# Patient Record
Sex: FEMALE | Race: WHITE | NOT HISPANIC OR LATINO | Employment: OTHER | URBAN - METROPOLITAN AREA
[De-identification: names, ages, dates, MRNs, and addresses within clinical notes are randomized per-mention and may not be internally consistent; named-entity substitution may affect disease eponyms.]

---

## 2017-01-05 ENCOUNTER — CLINICAL SUPPORT (OUTPATIENT)
Dept: SPEECH THERAPY | Facility: HOSPITAL | Age: 42
End: 2017-01-05
Attending: OTOLARYNGOLOGY
Payer: COMMERCIAL

## 2017-01-05 ENCOUNTER — OFFICE VISIT (OUTPATIENT)
Dept: OTOLARYNGOLOGY | Facility: CLINIC | Age: 42
End: 2017-01-05
Payer: COMMERCIAL

## 2017-01-05 VITALS
WEIGHT: 105 LBS | TEMPERATURE: 99 F | HEART RATE: 61 BPM | SYSTOLIC BLOOD PRESSURE: 105 MMHG | DIASTOLIC BLOOD PRESSURE: 60 MMHG | BODY MASS INDEX: 17.47 KG/M2

## 2017-01-05 DIAGNOSIS — C02.9 SQUAMOUS CELL CARCINOMA OF TONGUE: Primary | ICD-10-CM

## 2017-01-05 DIAGNOSIS — R47.1 LINGUAL DYSARTHRIA: ICD-10-CM

## 2017-01-05 DIAGNOSIS — R47.1 DYSARTHRIA: ICD-10-CM

## 2017-01-05 DIAGNOSIS — Z98.890 S/P PARTIAL GLOSSECTOMY: ICD-10-CM

## 2017-01-05 DIAGNOSIS — C02.9 TONGUE CANCER: ICD-10-CM

## 2017-01-05 PROCEDURE — 99213 OFFICE O/P EST LOW 20 MIN: CPT | Mod: S$GLB,,, | Performed by: OTOLARYNGOLOGY

## 2017-01-05 PROCEDURE — 1159F MED LIST DOCD IN RCRD: CPT | Mod: S$GLB,,, | Performed by: OTOLARYNGOLOGY

## 2017-01-05 PROCEDURE — 99999 PR PBB SHADOW E&M-EST. PATIENT-LVL III: CPT | Mod: PBBFAC,,, | Performed by: OTOLARYNGOLOGY

## 2017-01-05 PROCEDURE — G8999 MOTOR SPEECH CURRENT STATUS: HCPCS | Mod: GN,CI | Performed by: SPEECH-LANGUAGE PATHOLOGIST

## 2017-01-05 PROCEDURE — 92507 TX SP LANG VOICE COMM INDIV: CPT | Mod: GN | Performed by: SPEECH-LANGUAGE PATHOLOGIST

## 2017-01-05 PROCEDURE — G9186 MOTOR SPEECH GOAL STATUS: HCPCS | Mod: GN,CH | Performed by: SPEECH-LANGUAGE PATHOLOGIST

## 2017-01-05 RX ORDER — ZOLPIDEM TARTRATE 5 MG/1
5 TABLET ORAL
COMMUNITY
Start: 2016-12-23 | End: 2017-12-23

## 2017-01-05 NOTE — PROGRESS NOTES
DIAGNOSIS:  Dysarthria (R47.1), s/p partial glossectomy (Z98.890), History tongue cancer (Z85.810)  REFERRING PHYSICIAN:  Maximus Morrow M.D., Head and Neck Surgical Oncologist  LENGTH OF VISIT:  1 hour    REASON FOR VISIT:  sixth therapy visit.     INTERVAL HISTORY:  Currently with sore throat, especially at night.  Feels mucous in chest.  Taking Zycam.    INTERVENTION:  Short-term objectives:  Ms. Mueller will  1. Present for therapy twice weekly for 4-6 weeks.   Being met.    2. Perform a home program 2-3x/day by report.   Progressing.    3. Demonstrate appropriate execution of oral stretches and motor exercises with clinician with % consistency.   Able to execute accurately (100%).  Goal met 12/8/16.    4. Be able to place and/or protrude her tongue tip at midline on 10 of 10 trials.   100%.  Goal met 12/8/16.   Improved control of R lateral border to inhibit it from overpowering and actually rising superiorly as she attempts to protrude tongue.      5. Be able to place her tongue tip in her L oral commissure.   Met 1/5/17 with effort.  (Readily placing in R oral commissure.)    6. Be able to place her tongue tip on her alveolar ridge with upper-lower arch separation of 5-10 mm.   Able to separate teeth 10 mm while holding tongue contact to alveolus.  Getting nice retroflexion to stretch.  Goal met 12/16, but continuing for maintenance.   Additionally, able to separate teeth slightly during productions of phonemes or phoneme groupings that require lingual approximation/contact of alveolar ridge and had better release of sound from oral cavity (vs damping down of signal through more approximated arches).    7. Be able to clear a sticky food from her hard palate with her tongue during swallowing with 1-3 swallows.   Deferred.     8. Be able to clear food residue from at least one area of her labial sulcus.   Improving.  Can, with typical assistance from lips, place tongue tip down into the anterior labial  "sulcus and feel labial frenulum.  Performing tongue sweep well.    9. Achieve at least partial lateral border elevation on the R.   Deferred.    Speech targets:  90% with sentences 1x/each, all contexts.  Transitioning to conversation, particularly relaxing musculature around lips which tends to be activated in a compensatory manner to control oral air flow, but which now contributes to distorted acoustic quality.  She is able to relax this when focused and can appreciate the acoustic improvement.  Use of mirror for monitoring in office was distracting to her as she was aware of not looking at her listener, but she can use this at home when alone.   Added some targets to address "sh" and its cognate which sometimes need increased lip rounding to help channel air centrally.      PROGRESS:  Good.    IMPRESSIONS:   This 40 yo woman appears to present with   1.  Mild dysarthria; improving.  2.  Mild oral phase dysphagia secondary to lingual deficits; improving.  3.  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  The only evidence of it was inability to impound air in the oral cavity on puffing cheeks and reported challenges when swishing liquids.  4.  S/p R lateral partial glossectomy with closure by secondary intention and Right radical tonsillectomy with resection of the anterior tonsil pillar and retromolar trigone on 9/28/16.  5.  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 Morrow.    G codes:  Motor Speech  Current status: FCM:  LEVEL 6: The individual is successfully able to communicate intelligibly in most activities, but some limitations in intelligibility are still apparent in vocational, avocational, and social activities. The individual rarely requires minimal cueing to produce complex " sentences/messages intelligibly. The individual usually uses compensatory strategies when encountering difficulty.   - CI   Projected status:  FCM:   LEVEL 7: The individual¢s ability to successfully and independently participate in vocational, avocational, or social activities is not limited by speech production.  Independent functioning may occasionally include the use of compensatory techniques.   - CH     RECOMMENDATIONS/PLAN OF CARE:  It is felt that Ms. Mueller would benefit from  1.  Continued speech therapy in two weeks for carry-over into spontaneous conversation.  Anticipate that as final session.  2.  Continued f/u with Dr. Morrow as directed.    Long-term goals:  Ms. Mueller will be able to   1.  Have improved clarity of speech sounds in conversational speech.  2.  Consume a regular diet with an oral preparatory phase within functional limits with or without appropriate compensatory strategies.

## 2017-01-05 NOTE — MR AVS SNAPSHOT
[facility]  [address]  [facility] [address]  Phone: [phone]  Fax: [phone]                  Rose Mueller   2017 9:15 AM   Office Visit    Description:  Female : 1975   Provider:  Maximus Morrow MD   Department:  [facility]           Reason for Visit     Follow-up           Diagnoses this Visit        Comments    Squamous cell carcinoma of tongue    -  Primary            To Do List           Future Appointments        Provider Department Dept Phone    2017 9:00 AM Antonia Madera MA, CCC-SLP [facility] [phone]    2017 10:00 AM Maximus Morrow MD [facility] [phone]      Goals (5 Years of Data)     None      Follow-Up and Disposition     Return in about 6 weeks (around 2017).      [facility]     [facility] -  Assistance  Registered nurses in the [facility] provide clinical advisement, health education, appointment booking, and other advisory services.  Call for this free service at [phone].             Medications           Message regarding Medications     Verify the changes and/or additions to your medication regime listed below are the same as discussed with your clinician today.  If any of these changes or additions are incorrect, please notify your healthcare provider.             Verify that the below list of medications is an accurate representation of the medications you are currently taking.  If none reported, the list may be blank. If incorrect, please contact your healthcare provider. Carry this list with you in case of emergency.           Current Medications     alprazolam (XANAX XR) 0.5 MG Tb24     ascorbic acid, vitamin C, (VITAMIN C) 500 MG tablet Take 500 mg by mouth once daily.    CYANOCOBALAMIN, VITAMIN B-12, (VITAMIN B-12 ORAL) Take by mouth.    hydrocodone-acetaminophen (HYCET) solution 7.5-325 mg/15mL Take 15  mLs by mouth every 4 (four) hours as needed.    lansoprazole (PREVACID) 30 MG capsule Take 30 mg by mouth once daily.    minocycline (MINOCIN,DYNACIN) 100 MG capsule TK ONE C PO  QD    multivitamin (ONE DAILY MULTIVITAMIN) per tablet Take 1 tablet by mouth once daily.    ondansetron (ZOFRAN-ODT) 4 MG TbDL Take 1 tablet (4 mg total) by mouth every 8 (eight) hours as needed.    sumatriptan (IMITREX) 50 MG tablet TK ONE T PO FOR MIGRAINE HEADACHE. MAY REPEAT I 2 HOURS IF NOT RESOLVED. DO NOT TAKE MORE THAN 2 IN 1 DAY OR 3 IN 1 WEEK    TRINESSA LO 0.18/0.215/0.25 mg-25 mcg tablet TK 1 T PO QD    zolpidem (AMBIEN) 5 MG Tab Take 5 mg by mouth.           Clinical Reference Information           Vital Signs - Last Recorded  Most recent update: 1/5/2017 11:36 AM by Franchesca Kitchen MA    BP Pulse Temp Wt BMI    105/60 (BP Location: Left arm, Patient Position: Sitting) 61 98.6 °F (37 °C) (Tympanic) 47.6 kg (105 lb) 17.47 kg/m2      Blood Pressure          Most Recent Value    BP  105/60      Allergies as of 1/5/2017     No Known Allergies      Immunizations Administered on Date of Encounter - 1/5/2017     None      Instructions    Please be aware of the symptoms of head and neck cancer -  including, but not limited to, swelling in the neck, changes in voice and swallowing, and pain -- contact me if any of these symptoms appear and persist for more than 3-4 weeks.

## 2017-01-05 NOTE — PATIENT INSTRUCTIONS
Please be aware of the symptoms of head and neck cancer -  including, but not limited to, swelling in the neck, changes in voice and swallowing, and pain -- contact me if any of these symptoms appear and persist for more than 3-4 weeks.

## 2017-01-05 NOTE — PROGRESS NOTES
Subjective:     [facility]     Patient ID: Rose Mueller is a 41 y.o. female.    Chief Complaint:  Chief Complaint   Patient presents with    Follow-up      HPI     TREATMENT HISTORY:  1. Right partial glossectomy, 9/28/2016    Rose Mueller returns to the [facility] for follow-up after her right partial glossectomy. She has some variable sensations on the right side of the tongue, and the adjacent gums are healing as well - there is less tenderness. She has been working with [facility], and she feels that her speech is continuing to improve. She saw her dentist 6 weeks ago and had a good exam and good films. She gets some funny sensations on the gums adjacent to the surgery site, but this is stable. There remains some sensitivity in the back of her tongue when she actively tries to stretch it.    Her history is as follows: she presented with a 1 year history of a right sided tongue lesion.  It was first seen by her dentist during a routine dental exam.  She then saw an oral surgeon, who thought it was related to irritation or trauma.  She was treated with Magic Mouthwash with no relief of symptoms.  She then saw an ENT in [place] who biopsied it last year, with benign results.  It never healed, and we discussed a wide excisional biopsy that she deferred.     There is no dysphagia, odynophagia, sore throat, or otalgia. She has no adenopathy, fever, chills, or weight loss.  She is a non smoker.     She does not use tobacco products. There have been no interval changes in her health.    Past Medical History   Diagnosis Date    Anxiety about health 9/6/2016     previously treated for CIRILO    Headache     Hx of psychiatric care     Lesion of tongue     Migraine headache     Psychiatric problem     Sciatica     Squamous cell carcinoma in situ of tongue 9/6/2016    Therapy        Past Surgical History   Procedure Laterality Date    Partial glossectomy Right 09/28/2016          Current Outpatient Prescriptions:     TRINESSA LO 0.18/0.215/0.25 mg-25 mcg tablet, TK 1 T PO QD, Disp: , Rfl: 9    alprazolam (XANAX XR) 0.5 MG Tb24, , Disp: , Rfl:     ascorbic acid, vitamin C, (VITAMIN C) 500 MG tablet, Take 500 mg by mouth once daily., Disp: , Rfl:     CYANOCOBALAMIN, VITAMIN B-12, (VITAMIN B-12 ORAL), Take by mouth., Disp: , Rfl:     hydrocodone-acetaminophen (HYCET) solution 7.5-325 mg/15mL, Take 15 mLs by mouth every 4 (four) hours as needed., Disp: 473 mL, Rfl: 0    lansoprazole (PREVACID) 30 MG capsule, Take 30 mg by mouth once daily., Disp: , Rfl:     minocycline (MINOCIN,DYNACIN) 100 MG capsule, TK ONE C PO  QD, Disp: , Rfl: 1    multivitamin (ONE DAILY MULTIVITAMIN) per tablet, Take 1 tablet by mouth once daily., Disp: , Rfl:     ondansetron (ZOFRAN-ODT) 4 MG TbDL, Take 1 tablet (4 mg total) by mouth every 8 (eight) hours as needed., Disp: 12 tablet, Rfl: 1    sumatriptan (IMITREX) 50 MG tablet, TK ONE T PO FOR MIGRAINE HEADACHE. MAY REPEAT I 2 HOURS IF NOT RESOLVED. DO NOT TAKE MORE THAN 2 IN 1 DAY OR 3 IN 1 WEEK, Disp: , Rfl: 0    zolpidem (AMBIEN) 5 MG Tab, Take 5 mg by mouth., Disp: , Rfl:     Review of patient's allergies indicates:  No Known Allergies    Social History     Social History    Marital status: Single     Spouse name: N/A    Number of children: 0    Years of education: N/A     Occupational History          self employed     Social History Main Topics    Smoking status: Never Smoker    Smokeless tobacco: Not on file    Alcohol use Not on file    Drug use: Not on file    Sexual activity: Not on file     Other Topics Concern    Patient Feels They Ought To Cut Down On Drinking/Drug Use No    Patient Annoyed By Others Criticizing Their Drinking/Drug Use No    Patient Has Felt Bad Or Guilty About Drinking/Drug Use No    Patient Has Had A Drink/Used Drugs As An Eye Opener In The [place] No     Social History Narrative    She is an ,  single, lives alone, no children       Family History   Problem Relation Age of Onset    Cancer Maternal Grandfather     Celiac disease Sister     Crohn's disease Sister     Autoimmune disease Sister      Review of Systems   Constitutional: Negative for activity change, appetite change, chills, diaphoresis, fatigue, fever and unexpected weight change.   HENT: Negative for congestion, dental problem, drooling, ear discharge, ear pain, facial swelling, hearing loss, mouth sores, nosebleeds, postnasal drip, rhinorrhea, sinus pressure, sneezing, sore throat, tinnitus, trouble swallowing and voice change.    Eyes: Negative for pain, discharge, redness, itching and visual disturbance.   Respiratory: Negative for cough, choking and shortness of breath.    Cardiovascular: Negative for chest pain, palpitations and leg swelling.   Gastrointestinal: Negative for abdominal distention, abdominal pain, constipation, diarrhea, nausea and vomiting.   Endocrine: Negative for cold intolerance and heat intolerance.   Genitourinary: Negative for difficulty urinating, dysuria and hematuria.   Musculoskeletal: Negative for arthralgias, back pain, gait problem, myalgias, neck pain and neck stiffness.   Skin: Negative for rash and wound.   Allergic/Immunologic: Negative for environmental allergies and immunocompromised state.   Neurological: Positive for numbness and headaches. Negative for dizziness, facial asymmetry, speech difficulty, weakness and light-headedness.   Hematological: Negative for adenopathy. Bruises/bleeds easily.   Psychiatric/Behavioral: Negative for dysphoric mood. The patient is not nervous/anxious.        Objective:      Physical Exam   Constitutional: She is oriented to person, place, and time. She appears well-developed and well-nourished. She is cooperative. She does not have a sickly appearance. She does not appear ill. No distress.   HENT:   Head: Normocephalic and atraumatic.   Right Ear: Hearing and  external ear normal.   Left Ear: Hearing and external ear normal.   Nose: Nose normal. No mucosal edema, rhinorrhea or nasal deformity.   Mouth/Throat: Uvula is midline, oropharynx is clear and moist and mucous membranes are normal. Mucous membranes are not pale, not dry and not cyanotic. She does not have dentures. No oral lesions. No trismus in the jaw. Normal dentition. No uvula swelling or lacerations. No oropharyngeal exudate, posterior oropharyngeal edema, posterior oropharyngeal erythema or tonsillar abscesses.            Eyes: Conjunctivae, EOM and lids are normal. Pupils are equal, round, and reactive to light. Right conjunctiva is not injected. Left conjunctiva is not injected.   Neck: Normal range of motion, full passive range of motion without pain and phonation normal. Neck supple. No JVD present. No tracheal deviation present. No thyroid mass and no thyromegaly present.   Salivary glands - there are no lesions or asymmetric findings in the submandibular or parotid glands     Cardiovascular: Normal rate, regular rhythm and normal pulses.    Pulmonary/Chest: Effort normal. No stridor. No respiratory distress.   Abdominal: She exhibits no distension. There is no guarding.   Musculoskeletal: She exhibits no edema or tenderness.   Lymphadenopathy:        Head (right side): No submental, no submandibular, no tonsillar, no preauricular and no posterior auricular adenopathy present.        Head (left side): No submental, no submandibular, no tonsillar, no preauricular and no posterior auricular adenopathy present.     She has no cervical adenopathy.        Right cervical: No deep cervical and no posterior cervical adenopathy present.       Left cervical: No deep cervical and no posterior cervical adenopathy present.        Right: No supraclavicular adenopathy present.        Left: No supraclavicular adenopathy present.   Neurological: She is alert and oriented to person, place, and time. No cranial nerve  deficit. Gait normal.   Skin: No lesion and no rash noted. No cyanosis or erythema. Nails show no clubbing.   Psychiatric: She has a normal mood and affect. Her speech is normal.   Vitals reviewed.          Assessment:       1. Squamous cell carcinoma of tongue       Plan:       Ms. 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 There is no indication for neck dissection. She has made great strides with speech therapy. I would like to see her back in 6 weeks for routine surveillance.

## 2017-01-05 NOTE — MR AVS SNAPSHOT
[facility]  [address]  [place] [address]  Phone: [phone]                  Rose Mueller   2017 10:00 AM   Clinical Support    Description:  Female : 1975   Provider:  Antonia Madera MA, CCC-SLP   Department:  [facility]           Diagnoses this Visit        Comments    Dysarthria         S/P partial glossectomy         Tongue cancer                To Do List           Future Appointments        Provider Department Dept Phone    2017 9:00 AM Antonia Madera MA, CCC-SLP [facility] [phone]      Goals (5 Years of Data)     None      [facility]     [facility] Care [facility] -  Assistance  Registered nurses in the [facility] provide clinical advisement, health education, appointment booking, and other advisory services.  Call for this free service at [phone].             Medications           Message regarding Medications     Verify the changes and/or additions to your medication regime listed below are the same as discussed with your clinician today.  If any of these changes or additions are incorrect, please notify your healthcare provider.             Verify that the below list of medications is an accurate representation of the medications you are currently taking.  If none reported, the list may be blank. If incorrect, please contact your healthcare provider. Carry this list with you in case of emergency.           Current Medications     alprazolam (XANAX XR) 0.5 MG Tb24     ascorbic acid, vitamin C, (VITAMIN C) 500 MG tablet Take 500 mg by mouth once daily.    CYANOCOBALAMIN, VITAMIN B-12, (VITAMIN B-12 ORAL) Take by mouth.    hydrocodone-acetaminophen (HYCET) solution 7.5-325 mg/15mL Take 15 mLs by mouth every 4 (four) hours as needed.    lansoprazole (PREVACID) 30 MG capsule Take 30 mg by mouth once daily.    minocycline (MINOCIN,DYNACIN) 100 MG capsule TK ONE C PO  QD     "multivitamin (ONE DAILY MULTIVITAMIN) per tablet Take 1 tablet by mouth once daily.    ondansetron (ZOFRAN-ODT) 4 MG TbDL Take 1 tablet (4 mg total) by mouth every 8 (eight) hours as needed.    sumatriptan (IMITREX) 50 MG tablet TK ONE T PO FOR MIGRAINE HEADACHE. MAY REPEAT I 2 HOURS IF NOT RESOLVED. DO NOT TAKE MORE THAN 2 IN 1 DAY OR 3 IN 1 WEEK    TRINESSA LO 0.18/0.215/0.25 mg-25 mcg tablet TK 1 T PO QD           Clinical Reference Information           Allergies as of 1/5/2017     No Known Allergies      Immunizations Administered on Date of Encounter - 1/5/2017     None      Orders Placed During Today's Visit      Normal Orders This Visit    SLP treatment for speech/language       Instructions    Oral exercises and stretches:  Do 2-3x per day, 10 reps each:  *  Yawning - to stretch the back of the throat and help the soft palate's range of motion  *  Scar stretching:  Use a clean or gloved finger and stretch the scar beneath your tongue and the R side of your your tongue.  Try to  what is under the pad of your fingertip and stretch toward your tongue tip.    *  Try to align your tongue tip with the center of your upper or lower teeth and hold 5 seconds.  *  Find the center spot and stick your tongue out as far as possible; hold for 5-6 seconds.  Use the stack of tongue depressors to keep your jaw out of play.  *  Move your tongue tip along the inside of your lower teeth as far back as you can hold and hold for 5 seconds. Do in each direction.  Repeat inside top arch and outside top and bottom arches.  *  Move your tongue toward the Left corner of your mouth and hold (at farthest point) for 5 seconds.  *  Curl your back as far as possible and hold 5 seconds.  *  Raise your tongue tip to the spot right behind your top teeth (help with your jaw as needed right now) and hold the spot where you feel a stretch (the farthest away you can hold your jaw and still touch the tongue tip).  * Do an "effortful swallow" " "-  Like you are trying to get a glob of peanut butter or big pill down.  This asks all the parts involved in swallowing to move as far as possible, and will include your tongue.  Do 10.     Speech:  Manage saliva as soon as you notice it to help eliminate "lip control."    Try some tongue twisters with /s/ and "sh" in them (Yvonne sells seashells...).  Use round lips to help channel air centrally for "sh" and "zh."    I saw Sarah Beth sitting in a [employer] shop.  So, this is the .      Watch -- as you can tolerate -- in the mirror to monitor for excess tension around the lips and relax out of it.    Wednesday the fourth or Thursday the fifth.         "

## 2017-02-16 ENCOUNTER — OFFICE VISIT (OUTPATIENT)
Dept: OTOLARYNGOLOGY | Facility: CLINIC | Age: 42
End: 2017-02-16
Payer: COMMERCIAL

## 2017-02-16 VITALS
BODY MASS INDEX: 17.94 KG/M2 | SYSTOLIC BLOOD PRESSURE: 129 MMHG | DIASTOLIC BLOOD PRESSURE: 58 MMHG | TEMPERATURE: 98 F | HEART RATE: 57 BPM | WEIGHT: 107.81 LBS

## 2017-02-16 DIAGNOSIS — C02.9 SQUAMOUS CELL CARCINOMA OF TONGUE: Primary | ICD-10-CM

## 2017-02-16 DIAGNOSIS — R47.1 DYSARTHRIA: ICD-10-CM

## 2017-02-16 PROCEDURE — 99999 PR PBB SHADOW E&M-EST. PATIENT-LVL III: CPT | Mod: PBBFAC,,, | Performed by: OTOLARYNGOLOGY

## 2017-02-16 PROCEDURE — 99213 OFFICE O/P EST LOW 20 MIN: CPT | Mod: S$GLB,,, | Performed by: OTOLARYNGOLOGY

## 2017-02-16 NOTE — PROGRESS NOTES
Subjective:     [facility]     Patient ID: Rose Mueller is a 41 y.o. female.    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 Right partial glossectomy, 9/28/2016    Rose Mueller returns to [facility] for follow-up after her right partial glossectomy. She has some tingling still on the side of the tongue, and she can feel it when she stretches it. It is not painful, though. She has one more visit with Antonia, and she feels that her speech is continuing to improve. She saw her dentist 6 weeks ago and had a good exam and good films. The sensations on the gums are improved as well.     Her history is as follows: she presented with a 1 year history of a right sided tongue lesion.  It was first seen by her dentist during a routine dental exam.  She then saw an oral surgeon, who thought it was related to irritation or trauma.  She was treated with Magic Mouthwash with no relief of symptoms.  She then saw an ENT in [place] who biopsied it last year, with benign results.  It never healed, and we discussed a wide excisional biopsy that she deferred.     There is no dysphagia, odynophagia, sore throat, or otalgia. She has no adenopathy, fever, chills, or weight loss.  She is a non smoker.     She does not use tobacco products. There have been no interval changes in her health.    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 9/6/2016    Therapy        Past Surgical History   Procedure Laterality Date    Partial glossectomy Right 09/28/2016         Current Outpatient Prescriptions:     ascorbic acid, vitamin C, (VITAMIN C) 500 MG tablet, Take 500 mg by  mouth once daily., Disp: , Rfl:     CYANOCOBALAMIN, VITAMIN B-12, (VITAMIN B-12 ORAL), Take by mouth., Disp: , Rfl:     multivitamin (ONE DAILY MULTIVITAMIN) per tablet, Take 1 tablet by mouth once daily., Disp: , Rfl:     TRINESSA LO 0.18/0.215/0.25 mg-25 mcg tablet, TK 1 T PO QD, Disp: , Rfl: 9    alprazolam (XANAX XR) 0.5 MG Tb24, , Disp: , Rfl:     hydrocodone-acetaminophen (HYCET) solution 7.5-325 mg/15mL, Take 15 mLs by mouth every 4 (four) hours as needed., Disp: 473 mL, Rfl: 0    lansoprazole (PREVACID) 30 MG capsule, Take 30 mg by mouth once daily., Disp: , Rfl:     minocycline (MINOCIN,DYNACIN) 100 MG capsule, TK ONE C PO  QD, Disp: , Rfl: 1    ondansetron (ZOFRAN-ODT) 4 MG TbDL, Take 1 tablet (4 mg total) by mouth every 8 (eight) hours as needed., Disp: 12 tablet, Rfl: 1    sumatriptan (IMITREX) 50 MG tablet, TK ONE T PO FOR MIGRAINE HEADACHE. MAY REPEAT I 2 HOURS IF NOT RESOLVED. DO NOT TAKE MORE THAN 2 IN 1 DAY OR 3 IN 1 WEEK, Disp: , Rfl: 0    zolpidem (AMBIEN) 5 MG Tab, Take 5 mg by mouth., Disp: , Rfl:     Review of patient's allergies indicates:  No Known Allergies    Social History     Social History    Marital status: Single     Spouse name: N/A    Number of children: 0    Years of education: N/A     Occupational History          self employed     Social History Main Topics    Smoking status: Never Smoker    Smokeless tobacco: Not on file    Alcohol use Not on file    Drug use: Not on file    Sexual activity: Not on file     Other Topics Concern    Patient Feels They Ought To Cut Down On Drinking/Drug Use No    Patient Annoyed By Others Criticizing Their Drinking/Drug Use No    Patient Has Felt Bad Or Guilty About Drinking/Drug Use No    Patient Has Had A Drink/Used Drugs As An Eye Opener In The [place] No     Social History Narrative    She is an , single, lives alone, no children       Family History   Problem Relation Age of Onset    Cancer Maternal  Grandfather     Celiac disease Sister     Crohn's disease Sister     Autoimmune disease Sister      Review of Systems   Constitutional: Negative for activity change, appetite change, chills, diaphoresis, fatigue, fever and unexpected weight change.   HENT: Negative for congestion, dental problem, drooling, ear discharge, ear pain, facial swelling, hearing loss, mouth sores, nosebleeds, postnasal drip, rhinorrhea, sinus pressure, sneezing, sore throat, tinnitus, trouble swallowing and voice change.    Eyes: Negative for pain, discharge, redness, itching and visual disturbance.   Respiratory: Negative for cough, choking and shortness of breath.    Cardiovascular: Negative for chest pain, palpitations and leg swelling.   Gastrointestinal: Negative for abdominal distention, abdominal pain, constipation, diarrhea, nausea and vomiting.   Endocrine: Negative for cold intolerance and heat intolerance.   Genitourinary: Negative for difficulty urinating, dysuria and hematuria.   Musculoskeletal: Negative for arthralgias, back pain, gait problem, myalgias, neck pain and neck stiffness.   Skin: Negative for rash and wound.   Allergic/Immunologic: Negative for environmental allergies and immunocompromised state.   Neurological: Positive for numbness and headaches. Negative for dizziness, facial asymmetry, speech difficulty, weakness and light-headedness.   Hematological: Negative for adenopathy. Bruises/bleeds easily.   Psychiatric/Behavioral: Negative for dysphoric mood. The patient is not nervous/anxious.        Objective:      Physical Exam   Constitutional: She is oriented to person, place, and time. She appears well-developed and well-nourished. She is cooperative. She does not have a sickly appearance. She does not appear ill. No distress.   HENT:   Head: Normocephalic and atraumatic.   Right Ear: Hearing and external ear normal.   Left Ear: Hearing and external ear normal.   Nose: Nose normal. No mucosal edema,  rhinorrhea or nasal deformity.   Mouth/Throat: Uvula is midline, oropharynx is clear and moist and mucous membranes are normal. Mucous membranes are not pale, not dry and not cyanotic. She does not have dentures. No oral lesions. No trismus in the jaw. Normal dentition. No uvula swelling or lacerations. No oropharyngeal exudate, posterior oropharyngeal edema, posterior oropharyngeal erythema or tonsillar abscesses.       NP: No lesions of posterior wall  OP: No lesions of posterior wall or BOT. BOT is soft to palpation  Larynx: No lesions of glottic or supraglottic larynx. Normal vocal fold mobility    HP: No lesions of pyriform sinuses or postcricoid region  Mirror examination was performed.     Eyes: Conjunctivae, EOM and lids are normal. Pupils are equal, round, and reactive to light. Right conjunctiva is not injected. Left conjunctiva is not injected.   Neck: Normal range of motion, full passive range of motion without pain and phonation normal. Neck supple. No JVD present. No tracheal deviation present. No thyroid mass and no thyromegaly present.   Salivary glands - there are no lesions or asymmetric findings in the submandibular or parotid glands     Cardiovascular: Normal rate, regular rhythm and normal pulses.    Pulmonary/Chest: Effort normal. No stridor. No respiratory distress.   Abdominal: She exhibits no distension. There is no guarding.   Musculoskeletal: She exhibits no edema or tenderness.   Lymphadenopathy:        Head (right side): No submental, no submandibular, no tonsillar, no preauricular and no posterior auricular adenopathy present.        Head (left side): No submental, no submandibular, no tonsillar, no preauricular and no posterior auricular adenopathy present.     She has no cervical adenopathy.        Right cervical: No deep cervical and no posterior cervical adenopathy present.       Left cervical: No deep cervical and no posterior cervical adenopathy present.        Right: No  supraclavicular adenopathy present.        Left: No supraclavicular adenopathy present.   Neurological: She is alert and oriented to person, place, and time. No cranial nerve deficit. Gait normal.   Skin: No lesion and no rash noted. No cyanosis or erythema. Nails show no clubbing.   Psychiatric: She has a normal mood and affect. Her speech is normal.   Vitals reviewed.        Assessment:       1. Squamous cell carcinoma of tongue     2. Dysarthria       Plan:       Ms. 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 There is no indication for neck dissection. She continues to improve. I would like to see her back in 6 weeks for routine surveillance.

## 2017-02-16 NOTE — MR AVS SNAPSHOT
[facility] - Head/Neck Surg [facility]  [address]  [facility] [address]  Phone: [phone]  Fax: [phone]                  Rose Mueller   2017 10:00 AM   Office Visit    Description:  Female : 1975   Provider:  Maximus Morrow MD   Department:  [facility] - Head/Neck Surg Onc           Reason for Visit     Follow-up           Diagnoses this Visit        Comments    Squamous cell carcinoma of tongue    -  Primary     Dysarthria                To Do List           Goals (5 Years of Data)     None      Follow-Up and Disposition     Return in about 6 weeks (around 3/30/2017).      [facility]     [facility] On Call Nurse [facility] Line -  Assistance  Registered nurses in the [facility] On Call Center provide clinical advisement, health education, appointment booking, and other advisory services.  Call for this free service at [phone].             Medications           Message regarding Medications     Verify the changes and/or additions to your medication regime listed below are the same as discussed with your clinician today.  If any of these changes or additions are incorrect, please notify your healthcare provider.             Verify that the below list of medications is an accurate representation of the medications you are currently taking.  If none reported, the list may be blank. If incorrect, please contact your healthcare provider. Carry this list with you in case of emergency.           Current Medications     ascorbic acid, vitamin C, (VITAMIN C) 500 MG tablet Take 500 mg by mouth once daily.    CYANOCOBALAMIN, VITAMIN B-12, (VITAMIN B-12 ORAL) Take by mouth.    multivitamin (ONE DAILY MULTIVITAMIN) per tablet Take 1 tablet by mouth once daily.    TRINESSA LO 0.18/0.215/0.25 mg-25 mcg tablet TK 1 T PO QD    alprazolam (XANAX XR) 0.5 MG Tb24     hydrocodone-acetaminophen (HYCET) solution 7.5-325 mg/15mL Take 15 mLs by mouth every 4 (four) hours as needed.    lansoprazole  (PREVACID) 30 MG capsule Take 30 mg by mouth once daily.    minocycline (MINOCIN,DYNACIN) 100 MG capsule TK ONE C PO  QD    ondansetron (ZOFRAN-ODT) 4 MG TbDL Take 1 tablet (4 mg total) by mouth every 8 (eight) hours as needed.    sumatriptan (IMITREX) 50 MG tablet TK ONE T PO FOR MIGRAINE HEADACHE. MAY REPEAT I 2 HOURS IF NOT RESOLVED. DO NOT TAKE MORE THAN 2 IN 1 DAY OR 3 IN 1 WEEK    zolpidem (AMBIEN) 5 MG Tab Take 5 mg by mouth.           Clinical Reference Information           Your Vitals Were     BP Pulse Temp Weight BMI    129/58 57 97.9 °F (36.6 °C) (Tympanic) 48.9 kg (107 lb 12.9 oz) 17.94 kg/m2      Blood Pressure          Most Recent Value    BP  (!)  129/58      Allergies as of 2/16/2017     No Known Allergies      Immunizations Administered on Date of Encounter - 2/16/2017     None      Instructions    Please be aware of the symptoms of head and neck cancer -  including, but not limited to, swelling in the neck, changes in voice and swallowing, and pain -- contact me if any of these symptoms appear and persist for more than 3-4 weeks.          Language Assistance Services     ATTENTION: Language assistance services are available, free of charge. Please call [phone].      ATENCIÓN: Si paris steve, tiene a gómez disposición servicios gratuitos de asistencia lingüística. Llame al [phone].     [facility] Ý: N?u b?n nói Ti?ng Vi?t, có các d?ch v? h? tr? ngôn ng? mi?n phí dành cho b?n. G?i s? [phone].         [facility] complies with applicable Federal civil rights laws and does not discriminate on the basis of race, color, national origin, age, disability, or sex.

## 2017-03-21 ENCOUNTER — PATIENT MESSAGE (OUTPATIENT)
Dept: OTOLARYNGOLOGY | Facility: CLINIC | Age: 42
End: 2017-03-21

## 2017-04-04 ENCOUNTER — OFFICE VISIT (OUTPATIENT)
Dept: OTOLARYNGOLOGY | Facility: CLINIC | Age: 42
End: 2017-04-04
Payer: COMMERCIAL

## 2017-04-04 VITALS
BODY MASS INDEX: 17.65 KG/M2 | HEART RATE: 58 BPM | WEIGHT: 106.06 LBS | TEMPERATURE: 97 F | DIASTOLIC BLOOD PRESSURE: 62 MMHG | SYSTOLIC BLOOD PRESSURE: 101 MMHG

## 2017-04-04 DIAGNOSIS — C02.9 SQUAMOUS CELL CARCINOMA OF TONGUE: Primary | ICD-10-CM

## 2017-04-04 PROCEDURE — 99999 PR PBB SHADOW E&M-EST. PATIENT-LVL III: CPT | Mod: PBBFAC,,, | Performed by: OTOLARYNGOLOGY

## 2017-04-04 PROCEDURE — 99213 OFFICE O/P EST LOW 20 MIN: CPT | Mod: S$GLB,,, | Performed by: OTOLARYNGOLOGY

## 2017-04-04 PROCEDURE — 1160F RVW MEDS BY RX/DR IN RCRD: CPT | Mod: S$GLB,,, | Performed by: OTOLARYNGOLOGY

## 2017-04-04 NOTE — MR AVS SNAPSHOT
[facility] - Head[facility]  [address]  [facility] [address]  Phone: [phone]  Fax: [phone]                  Rose Mueller   2017 10:45 AM   Office Visit    Description:  Female : 1975   Provider:  Maximus Morrow MD   Department:  [facility]           Reason for Visit     Follow-up           Diagnoses this Visit        Comments    Squamous cell carcinoma of tongue    -  Primary            To Do List           Goals (5 Years of Data)     None      Follow-Up and Disposition     Return in about 6 weeks (around 2017).      [facility] On Call     [facility] On Call Nurse Care Line -  Assistance  Unless otherwise directed by your provider, please contact [facility], our nurse care line that is available for  assistance.     Registered nurses in the [facility] On Call Center provide: appointment scheduling, clinical advisement, health education, and other advisory services.  Call: [phone] (toll free)               Medications           Message regarding Medications     Verify the changes and/or additions to your medication regime listed below are the same as discussed with your clinician today.  If any of these changes or additions are incorrect, please notify your healthcare provider.             Verify that the below list of medications is an accurate representation of the medications you are currently taking.  If none reported, the list may be blank. If incorrect, please contact your healthcare provider. Carry this list with you in case of emergency.           Current Medications     alprazolam (XANAX XR) 0.5 MG Tb24     ascorbic acid, vitamin C, (VITAMIN C) 500 MG tablet Take 500 mg by mouth once daily.    CYANOCOBALAMIN, VITAMIN B-12, (VITAMIN B-12 ORAL) Take by mouth.    hydrocodone-acetaminophen (HYCET) solution 7.5-325 mg/15mL Take 15 mLs by mouth every 4 (four) hours as needed.    lansoprazole (PREVACID) 30 MG capsule Take 30 mg by mouth  once daily.    minocycline (MINOCIN,DYNACIN) 100 MG capsule TK ONE C PO  QD    multivitamin (ONE DAILY MULTIVITAMIN) per tablet Take 1 tablet by mouth once daily.    ondansetron (ZOFRAN-ODT) 4 MG TbDL Take 1 tablet (4 mg total) by mouth every 8 (eight) hours as needed.    sumatriptan (IMITREX) 50 MG tablet TK ONE T PO FOR MIGRAINE HEADACHE. MAY REPEAT I 2 HOURS IF NOT RESOLVED. DO NOT TAKE MORE THAN 2 IN 1 DAY OR 3 IN 1 WEEK    TRINESSA LO 0.18/0.215/0.25 mg-25 mcg tablet TK 1 T PO QD    zolpidem (AMBIEN) 5 MG Tab Take 5 mg by mouth.           Clinical Reference Information           Your Vitals Were     BP Pulse Temp Weight BMI    101/62 58 97.3 °F (36.3 °C) 48.1 kg (106 lb 0.7 oz) 17.65 kg/m2      Blood Pressure          Most Recent Value    BP  101/62      Allergies as of 4/4/2017     No Known Allergies      Immunizations Administered on Date of Encounter - 4/4/2017     None      Instructions    Please be aware of the symptoms of head and neck cancer -  including, but not limited to, swelling in the neck, changes in voice and swallowing, and pain -- contact me if any of these symptoms appear and persist for more than 3-4 weeks.          Language Assistance Services     ATTENTION: Language assistance services are available, free of charge. Please call [phone].      ATENCIÓN: Si habla español, tiene a gómez disposición servicios gratuitos de asistencia lingüística. Llame al [phone].     [facility] Ý: N?u b?n nói Ti?ng Vi?t, có các d?ch v? h? tr? ngôn ng? mi?n phí dành cho b?n. G?i s? [phone].         [facility] complies with applicable Federal civil rights laws and does not discriminate on the basis of race, color, national origin, age, disability, or sex.

## 2017-04-04 NOTE — PROGRESS NOTES
[facility]    Subjective:      Patient ID: Rose Mueller is a 41 y.o. female.    Chief Complaint:  Chief Complaint   Patient presents with    Follow-up     pT1 Squamous cell carcinoma of tongue      HPI     TREATMENT HISTORY:  1. Right partial glossectomy, 9/28/2016    [facility] for [facility]-up after her right partial glossectomy. She has some tingling still on the side of the tongue, and she can feel it when she stretches it. It is not painful, though. She has one more visit with Antonia, and she feels that her speech is continuing to improve. She has no pain and is doing well.     Her history is as follows: she presented with a 1 year history of a right sided tongue lesion.  It was first seen by her dentist during a routine dental exam.      There is no dysphagia, odynophagia, sore throat, or otalgia. She has no adenopathy, fever, chills, or weight loss.  She is a non-smoker.     She does not use tobacco products. There have been no interval changes in her health.    Past Medical History:   Diagnosis Date    Anxiety about health 9/6/2016    previously treated for CIRILO    Headache     Hx of psychiatric care     Migraine headache     Psychiatric problem     Sciatica     Therapy        Past Surgical History:   Procedure Laterality Date    PARTIAL GLOSSECTOMY Right 09/28/2016         Current Outpatient Prescriptions:     alprazolam (XANAX XR) 0.5 MG Tb24, , Disp: , Rfl:     ascorbic acid, vitamin C, (VITAMIN C) 500 MG tablet, Take 500 mg by mouth once daily., Disp: , Rfl:     CYANOCOBALAMIN, VITAMIN B-12, (VITAMIN B-12 ORAL), Take by mouth., Disp: , Rfl:     hydrocodone-acetaminophen (HYCET) solution 7.5-325 mg/15mL, Take 15 mLs by mouth every 4 (four) hours as needed., Disp: 473 mL, Rfl: 0    lansoprazole (PREVACID) 30 MG capsule, Take 30 mg by mouth once daily., Disp: , Rfl:     minocycline (MINOCIN,DYNACIN) 100 MG capsule, TK ONE C PO   QD, Disp: , Rfl: 1    multivitamin (ONE DAILY MULTIVITAMIN) per tablet, Take 1 tablet by mouth once daily., Disp: , Rfl:     ondansetron (ZOFRAN-ODT) 4 MG TbDL, Take 1 tablet (4 mg total) by mouth every 8 (eight) hours as needed., Disp: 12 tablet, Rfl: 1    sumatriptan (IMITREX) 50 MG tablet, TK ONE T PO FOR MIGRAINE HEADACHE. MAY REPEAT I 2 HOURS IF NOT RESOLVED. DO NOT TAKE MORE THAN 2 IN 1 DAY OR 3 IN 1 WEEK, Disp: , Rfl: 0    TRINESSA LO 0.18/0.215/0.25 mg-25 mcg tablet, TK 1 T PO QD, Disp: , Rfl: 9    zolpidem (AMBIEN) 5 MG Tab, Take 5 mg by mouth., Disp: , Rfl:     Review of patient's allergies indicates:  No Known Allergies    Social History     Social History    Marital status: Single     Spouse name: N/A    Number of children: 0    Years of education: N/A     Occupational History          self employed     Social History Main Topics    Smoking status: Never Smoker    Smokeless tobacco: Not on file    Alcohol use Not on file    Drug use: Not on file    Sexual activity: Not on file     Other Topics Concern    Patient Feels They Ought To Cut Down On Drinking/Drug Use No    Patient Annoyed By Others Criticizing Their Drinking/Drug Use No    Patient Has Felt Bad Or Guilty About Drinking/Drug Use No    Patient Has Had A Drink/Used Drugs As An Eye Opener In The [facility] No     Social History Narrative    She is an , single, lives alone, no children       Family History   Problem Relation Age of Onset    Cancer Maternal Grandfather     Celiac disease Sister     Crohn's disease Sister     Autoimmune disease Sister      Review of Systems   Constitutional: Negative for activity change, appetite change, chills, diaphoresis, fatigue, fever and unexpected weight change.   HENT: Negative for congestion, dental problem, drooling, ear discharge, ear pain, facial swelling, hearing loss, mouth sores, nosebleeds, postnasal drip, rhinorrhea, sinus pressure, sneezing, sore throat, tinnitus,  trouble swallowing and voice change.    Eyes: Negative for pain, discharge, redness, itching and visual disturbance.   Respiratory: Negative for cough, choking and shortness of breath.    Cardiovascular: Negative for chest pain, palpitations and leg swelling.   Gastrointestinal: Negative for abdominal distention, abdominal pain, constipation, diarrhea, nausea and vomiting.   Endocrine: Negative for cold intolerance and heat intolerance.   Genitourinary: Negative for difficulty urinating, dysuria and hematuria.   Musculoskeletal: Negative for arthralgias, back pain, gait problem, myalgias, neck pain and neck stiffness.   Skin: Negative for rash and wound.   Allergic/Immunologic: Negative for environmental allergies and immunocompromised state.   Neurological: Positive for numbness and headaches. Negative for dizziness, facial asymmetry, speech difficulty, weakness and light-headedness.   Hematological: Negative for adenopathy. Bruises/bleeds easily.   Psychiatric/Behavioral: Negative for dysphoric mood. The patient is not nervous/anxious.        Objective:      Physical Exam   Constitutional: She is oriented to person, place, and time. She appears well-developed and well-nourished. She is cooperative. She does not have a sickly appearance. She does not appear ill. No distress.   HENT:   Head: Normocephalic and atraumatic.   Right Ear: Hearing and external ear normal.   Left Ear: Hearing and external ear normal.   Nose: Nose normal. No mucosal edema, rhinorrhea or nasal deformity.   Mouth/Throat: Uvula is midline, oropharynx is clear and moist and mucous membranes are normal. Mucous membranes are not pale, not dry and not cyanotic. She does not have dentures. No oral lesions. No trismus in the jaw. Normal dentition. No uvula swelling or lacerations. No oropharyngeal exudate, posterior oropharyngeal edema, posterior oropharyngeal erythema or tonsillar abscesses.       NP: No lesions of posterior wall  OP: No lesions of  posterior wall or BOT. BOT is soft to palpation  Larynx: No lesions of glottic or supraglottic larynx. Normal vocal fold mobility    HP: No lesions of pyriform sinuses or postcricoid region  Mirror examination was performed.     Eyes: Conjunctivae, EOM and lids are normal. Pupils are equal, round, and reactive to light. Right conjunctiva is not injected. Left conjunctiva is not injected.   Neck: Normal range of motion, full passive range of motion without pain and phonation normal. Neck supple. No JVD present. No tracheal deviation present. No thyroid mass and no thyromegaly present.   Salivary glands - there are no lesions or asymmetric findings in the submandibular or parotid glands     Cardiovascular: Normal rate, regular rhythm and normal pulses.    Pulmonary/Chest: Effort normal. No stridor. No respiratory distress.   Abdominal: She exhibits no distension. There is no guarding.   Musculoskeletal: She exhibits no edema or tenderness.   Lymphadenopathy:        Head (right side): No submental, no submandibular, no tonsillar, no preauricular and no posterior auricular adenopathy present.        Head (left side): No submental, no submandibular, no tonsillar, no preauricular and no posterior auricular adenopathy present.     She has no cervical adenopathy.        Right cervical: No deep cervical and no posterior cervical adenopathy present.       Left cervical: No deep cervical and no posterior cervical adenopathy present.        Right: No supraclavicular adenopathy present.        Left: No supraclavicular adenopathy present.   Neurological: She is alert and oriented to person, place, and time. No cranial nerve deficit. Gait normal.   Skin: No lesion and no rash noted. No cyanosis or erythema. Nails show no clubbing.   Psychiatric: She has a normal mood and affect. Her speech is normal.   Vitals reviewed.        Assessment:       1. Squamous cell carcinoma of tongue       Plan:       Ms. Mueller had a microinvasive  SCC of the right lateral tongue with negative margins. There is no indication for neck dissection. She continues to improve. I would like to see her back in another 6 weeks for routine surveillance.

## 2017-06-15 ENCOUNTER — OFFICE VISIT (OUTPATIENT)
Dept: OTOLARYNGOLOGY | Facility: CLINIC | Age: 42
End: 2017-06-15
Payer: COMMERCIAL

## 2017-06-15 VITALS
HEART RATE: 56 BPM | WEIGHT: 107.56 LBS | BODY MASS INDEX: 17.9 KG/M2 | SYSTOLIC BLOOD PRESSURE: 115 MMHG | TEMPERATURE: 99 F | RESPIRATION RATE: 20 BRPM | DIASTOLIC BLOOD PRESSURE: 64 MMHG

## 2017-06-15 DIAGNOSIS — C02.9 SQUAMOUS CELL CARCINOMA OF TONGUE: Primary | ICD-10-CM

## 2017-06-15 PROCEDURE — 99213 OFFICE O/P EST LOW 20 MIN: CPT | Mod: S$GLB,,, | Performed by: OTOLARYNGOLOGY

## 2017-06-15 PROCEDURE — 99999 PR PBB SHADOW E&M-EST. PATIENT-LVL III: CPT | Mod: PBBFAC,,, | Performed by: OTOLARYNGOLOGY

## 2017-06-15 NOTE — PROGRESS NOTES
[facility]    Subjective:      Patient ID: Rose Mueller is a 41 y.o. female.    Chief Complaint:  Chief Complaint   Patient presents with    Follow-up     Squamous cell carcinoma of tongue      HPI     TREATMENT HISTORY:  1. Right partial glossectomy, 9/28/2016    Rose [facility] for [facility]-up after her right partial glossectomy. She has some tingling still on the side of the tongue, but this is getting better. She thinks that her speech continues to improve, and she is optimistic about the future. She is trying to slow her speech down a little.     Her history is as follows: she presented with a 1 year history of a right sided tongue lesion.  It was first seen by her dentist during a routine dental exam.      There is no dysphagia, odynophagia, sore throat, or otalgia. She has no adenopathy, fever, chills, or weight loss.  She is a non-smoker.     She does not use tobacco products. There have been no interval changes in her health.    Past Medical History:   Diagnosis Date    Anxiety about health 9/6/2016    previously treated for CIRILO    Headache     Hx of psychiatric care     Migraine headache     Psychiatric problem     Sciatica     Therapy        Past Surgical History:   Procedure Laterality Date    PARTIAL GLOSSECTOMY Right 09/28/2016         Current Outpatient Prescriptions:     alprazolam (XANAX XR) 0.5 MG Tb24, , Disp: , Rfl:     ascorbic acid, vitamin C, (VITAMIN C) 500 MG tablet, Take 500 mg by mouth once daily., Disp: , Rfl:     CYANOCOBALAMIN, VITAMIN B-12, (VITAMIN B-12 ORAL), Take by mouth., Disp: , Rfl:     lansoprazole (PREVACID) 30 MG capsule, Take 30 mg by mouth once daily., Disp: , Rfl:     minocycline (MINOCIN,DYNACIN) 100 MG capsule, TK ONE C PO  QD, Disp: , Rfl: 1    multivitamin (ONE DAILY MULTIVITAMIN) per tablet, Take 1 tablet by mouth once daily., Disp: , Rfl:     ondansetron (ZOFRAN-ODT) 4 MG TbDL, Take 1 tablet  (4 mg total) by mouth every 8 (eight) hours as needed., Disp: 12 tablet, Rfl: 1    sumatriptan (IMITREX) 50 MG tablet, TK ONE T PO FOR MIGRAINE HEADACHE. MAY REPEAT I 2 HOURS IF NOT RESOLVED. DO NOT TAKE MORE THAN 2 IN 1 DAY OR 3 IN 1 WEEK, Disp: , Rfl: 0    TRINESSA LO 0.18/0.215/0.25 mg-25 mcg tablet, TK 1 T PO QD, Disp: , Rfl: 9    zolpidem (AMBIEN) 5 MG Tab, Take 5 mg by mouth., Disp: , Rfl:     Review of patient's allergies indicates:  No Known Allergies    Social History     Social History    Marital status: Single     Spouse name: N/A    Number of children: 0    Years of education: N/A     Occupational History          self employed     Social History Main Topics    Smoking status: Never Smoker    Smokeless tobacco: Not on file    Alcohol use Not on file    Drug use: Unknown    Sexual activity: Not on file     Other Topics Concern    Patient Feels They Ought To Cut Down On Drinking/Drug Use No    Patient Annoyed By Others Criticizing Their Drinking/Drug Use No    Patient Has Felt Bad Or Guilty About Drinking/Drug Use No    Patient Has Had A Drink/Used Drugs As An Eye Opener In The [facility] No     Social History Narrative    She is an , single, lives alone, no children       Family History   Problem Relation Age of Onset    Cancer Maternal Grandfather     Celiac disease Sister     Crohn's disease Sister     Autoimmune disease Sister      Review of Systems   Constitutional: Negative for activity change, appetite change, chills, diaphoresis, fatigue, fever and unexpected weight change.   HENT: Negative for congestion, dental problem, drooling, ear discharge, ear pain, facial swelling, hearing loss, mouth sores, nosebleeds, postnasal drip, rhinorrhea, sinus pressure, sneezing, sore throat, tinnitus, trouble swallowing and voice change.    Eyes: Negative for pain, discharge, redness, itching and visual disturbance.   Respiratory: Negative for cough, choking and shortness of breath.     Cardiovascular: Negative for chest pain, palpitations and leg swelling.   Gastrointestinal: Negative for abdominal distention, abdominal pain, constipation, diarrhea, nausea and vomiting.   Endocrine: Negative for cold intolerance and heat intolerance.   Genitourinary: Negative for difficulty urinating, dysuria and hematuria.   Musculoskeletal: Negative for arthralgias, back pain, gait problem, myalgias, neck pain and neck stiffness.   Skin: Negative for rash and wound.   Allergic/Immunologic: Negative for environmental allergies and immunocompromised state.   Neurological: Positive for numbness and headaches. Negative for dizziness, facial asymmetry, speech difficulty, weakness and light-headedness.   Hematological: Negative for adenopathy. Bruises/bleeds easily.   Psychiatric/Behavioral: Negative for dysphoric mood. The patient is not nervous/anxious.        Objective:      Physical Exam   Constitutional: She is oriented to person, place, and time. She appears well-developed and well-nourished. She is cooperative. She does not have a sickly appearance. She does not appear ill. No distress.   HENT:   Head: Normocephalic and atraumatic.   Right Ear: Hearing and external ear normal.   Left Ear: Hearing and external ear normal.   Nose: Nose normal. No mucosal edema, rhinorrhea or nasal deformity.   Mouth/Throat: Uvula is midline, oropharynx is clear and moist and mucous membranes are normal. Mucous membranes are not pale, not dry and not cyanotic. She does not have dentures. No oral lesions. No trismus in the jaw. Normal dentition. No uvula swelling or lacerations. No oropharyngeal exudate, posterior oropharyngeal edema, posterior oropharyngeal erythema or tonsillar abscesses.       NP: No lesions of posterior wall  OP: No lesions of posterior wall or BOT. BOT is soft to palpation  Larynx: No lesions of glottic or supraglottic larynx. Normal vocal fold mobility    HP: No lesions of pyriform sinuses or postcricoid  region  Mirror examination was performed.     Eyes: Conjunctivae, EOM and lids are normal. Pupils are equal, round, and reactive to light. Right conjunctiva is not injected. Left conjunctiva is not injected.   Neck: Normal range of motion, full passive range of motion without pain and phonation normal. Neck supple. No JVD present. No tracheal deviation present. No thyroid mass and no thyromegaly present.   Salivary glands - there are no lesions or asymmetric findings in the submandibular or parotid glands     Cardiovascular: Normal rate, regular rhythm and normal pulses.    Pulmonary/Chest: Effort normal. No stridor. No respiratory distress.   Abdominal: She exhibits no distension. There is no guarding.   Musculoskeletal: She exhibits no edema or tenderness.   Lymphadenopathy:        Head (right side): No submental, no submandibular, no tonsillar, no preauricular and no posterior auricular adenopathy present.        Head (left side): No submental, no submandibular, no tonsillar, no preauricular and no posterior auricular adenopathy present.     She has no cervical adenopathy.        Right cervical: No deep cervical and no posterior cervical adenopathy present.       Left cervical: No deep cervical and no posterior cervical adenopathy present.        Right: No supraclavicular adenopathy present.        Left: No supraclavicular adenopathy present.   Neurological: She is alert and oriented to person, place, and time. No cranial nerve deficit. Gait normal.   Skin: No lesion and no rash noted. No cyanosis or erythema. Nails show no clubbing.   Psychiatric: She has a normal mood and affect. Her speech is normal.   Vitals reviewed.        Assessment:       1. Squamous cell carcinoma of tongue       Plan:       Ms. 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 There is no indication for neck dissection. She continues to improve and has no evidence of recurrent disease. She has a stable  sensitive area that is likely related to decreased bulk over the lingual nerve. I would like to see her back in another 6 weeks for routine surveillance.

## 2017-07-27 ENCOUNTER — OFFICE VISIT (OUTPATIENT)
Dept: OTOLARYNGOLOGY | Facility: CLINIC | Age: 42
End: 2017-07-27
Payer: COMMERCIAL

## 2017-07-27 ENCOUNTER — TELEPHONE (OUTPATIENT)
Dept: SPEECH THERAPY | Facility: HOSPITAL | Age: 42
End: 2017-07-27

## 2017-07-27 VITALS — WEIGHT: 110.25 LBS | TEMPERATURE: 98 F | BODY MASS INDEX: 18.34 KG/M2

## 2017-07-27 DIAGNOSIS — C02.9 SQUAMOUS CELL CARCINOMA OF TONGUE: Primary | ICD-10-CM

## 2017-07-27 DIAGNOSIS — R45.89 ANXIETY ABOUT HEALTH: ICD-10-CM

## 2017-07-27 PROCEDURE — 99213 OFFICE O/P EST LOW 20 MIN: CPT | Mod: S$GLB,,, | Performed by: OTOLARYNGOLOGY

## 2017-07-27 PROCEDURE — 99999 PR PBB SHADOW E&M-EST. PATIENT-LVL III: CPT | Mod: PBBFAC,,, | Performed by: OTOLARYNGOLOGY

## 2017-07-27 NOTE — PROGRESS NOTES
"[facility]    Subjective:      Patient ID: Rose Mueller is a 42 y.o. female.    Chief Complaint:  Chief Complaint   Patient presents with    Follow-up     Squamous cell carcinoma of tongue      HPI     TREATMENT HISTORY:  1. Right partial glossectomy, 9/28/2016    [facility] for follow-up after her right partial glossectomy. She has some tightness still on the side of the tongue, but this is getting better. She thinks that her speech continues to improve, and she is optimistic about the future. She is trying to slow her speech down a little and stretch it. She will also get an "intense tingly" feeling with spicy foods and vinegar.      There is no dysphagia, odynophagia, sore throat, or otalgia. She has no adenopathy, fever, chills, or weight loss.  She is a non-smoker. She is eating well and gaining weight in a healthy fashion. Social/work situations are still frustrating because of her articulation.    She does not use tobacco products. There have been no interval changes in her health.    Past Medical History:   Diagnosis Date    Anxiety about health 9/6/2016    previously treated for CIRILO    Headache     Hx of psychiatric care     Migraine headache     Psychiatric problem     Sciatica     Therapy        Past Surgical History:   Procedure Laterality Date    PARTIAL GLOSSECTOMY Right 09/28/2016         Current Outpatient Prescriptions:     alprazolam (XANAX XR) 0.5 MG Tb24, , Disp: , Rfl:     ascorbic acid, vitamin C, (VITAMIN C) 500 MG tablet, Take 500 mg by mouth once daily., Disp: , Rfl:     CYANOCOBALAMIN, VITAMIN B-12, (VITAMIN B-12 ORAL), Take by mouth., Disp: , Rfl:     lansoprazole (PREVACID) 30 MG capsule, Take 30 mg by mouth once daily., Disp: , Rfl:     minocycline (MINOCIN,DYNACIN) 100 MG capsule, TK ONE C PO  QD, Disp: , Rfl: 1    multivitamin (ONE DAILY MULTIVITAMIN) per tablet, Take 1 tablet by mouth once daily., Disp: " , Rfl:     ondansetron (ZOFRAN-ODT) 4 MG TbDL, Take 1 tablet (4 mg total) by mouth every 8 (eight) hours as needed., Disp: 12 tablet, Rfl: 1    sumatriptan (IMITREX) 50 MG tablet, TK ONE T PO FOR MIGRAINE HEADACHE. MAY REPEAT I 2 HOURS IF NOT RESOLVED. DO NOT TAKE MORE THAN 2 IN 1 DAY OR 3 IN 1 WEEK, Disp: , Rfl: 0    TRINESSA LO 0.18/0.215/0.25 mg-25 mcg tablet, TK 1 T PO QD, Disp: , Rfl: 9    zolpidem (AMBIEN) 5 MG Tab, Take 5 mg by mouth., Disp: , Rfl:     Review of patient's allergies indicates:  No Known Allergies    Social History     Social History    Marital status: Single     Spouse name: N/A    Number of children: 0    Years of education: N/A     Occupational History          self employed     Social History Main Topics    Smoking status: Never Smoker    Smokeless tobacco: Never Used    Alcohol use Not on file    Drug use: Unknown    Sexual activity: Not on file     Other Topics Concern    Patient Feels They Ought To Cut Down On Drinking/Drug Use No    Patient Annoyed By Others Criticizing Their Drinking/Drug Use No    Patient Has Felt Bad Or Guilty About Drinking/Drug Use No    Patient Has Had A Drink/Used Drugs As An Eye Opener In [place] No     Social History Narrative    She is an , single, lives alone, no children       Family History   Problem Relation Age of Onset    Cancer Maternal Grandfather     Celiac disease Sister     Crohn's disease Sister     Autoimmune disease Sister      Review of Systems   Constitutional: Negative for activity change, appetite change, chills, diaphoresis, fatigue, fever and unexpected weight change.   HENT: Negative for congestion, dental problem, drooling, ear discharge, ear pain, facial swelling, hearing loss, mouth sores, nosebleeds, postnasal drip, rhinorrhea, sinus pressure, sneezing, sore throat, tinnitus, trouble swallowing and voice change.    Eyes: Negative for pain, discharge, redness, itching and visual disturbance.    Respiratory: Negative for cough, choking and shortness of breath.    Cardiovascular: Negative for chest pain, palpitations and leg swelling.   Gastrointestinal: Negative for abdominal distention, abdominal pain, constipation, diarrhea, nausea and vomiting.   Endocrine: Negative for cold intolerance and heat intolerance.   Genitourinary: Negative for difficulty urinating, dysuria and hematuria.   Musculoskeletal: Negative for arthralgias, back pain, gait problem, myalgias, neck pain and neck stiffness.   Skin: Negative for rash and wound.   Allergic/Immunologic: Negative for environmental allergies and immunocompromised state.   Neurological: Positive for numbness and headaches. Negative for dizziness, facial asymmetry, speech difficulty, weakness and light-headedness.   Hematological: Negative for adenopathy. Bruises/bleeds easily.   Psychiatric/Behavioral: Positive for dysphoric mood (frsutrated by social challenges with speech). The patient is not nervous/anxious.        Objective:      Physical Exam   Constitutional: She is oriented to person, place, and time. She appears well-developed and well-nourished. She is cooperative. She does not have a sickly appearance. She does not appear ill. No distress.   HENT:   Head: Normocephalic and atraumatic.   Right Ear: Hearing and external ear normal.   Left Ear: Hearing and external ear normal.   Nose: Nose normal. No mucosal edema, rhinorrhea or nasal deformity.   Mouth/Throat: Uvula is midline, oropharynx is clear and moist and mucous membranes are normal. Mucous membranes are not pale, not dry and not cyanotic. She does not have dentures. No oral lesions. No trismus in the jaw. Normal dentition. No uvula swelling or lacerations. No oropharyngeal exudate, posterior oropharyngeal edema, posterior oropharyngeal erythema or tonsillar abscesses.       NP: No lesions of posterior wall  OP: No lesions of posterior wall or BOT. BOT is soft to palpation  Larynx: No lesions of  glottic or supraglottic larynx. Normal vocal fold mobility    HP: No lesions of pyriform sinuses or postcricoid region  Mirror examination was performed.     Eyes: Conjunctivae, EOM and lids are normal. Pupils are equal, round, and reactive to light. Right conjunctiva is not injected. Left conjunctiva is not injected.   Neck: Normal range of motion, full passive range of motion without pain and phonation normal. Neck supple. No JVD present. No tracheal deviation present. No thyroid mass and no thyromegaly present.   Salivary glands - there are no lesions or asymmetric findings in the submandibular or parotid glands     Cardiovascular: Normal rate, regular rhythm and normal pulses.    Pulmonary/Chest: Effort normal. No stridor. No respiratory distress.   Abdominal: She exhibits no distension. There is no guarding.   Musculoskeletal: She exhibits no edema or tenderness.   Lymphadenopathy:        Head (right side): No submental, no submandibular, no tonsillar, no preauricular and no posterior auricular adenopathy present.        Head (left side): No submental, no submandibular, no tonsillar, no preauricular and no posterior auricular adenopathy present.     She has no cervical adenopathy.        Right cervical: No deep cervical and no posterior cervical adenopathy present.       Left cervical: No deep cervical and no posterior cervical adenopathy present.        Right: No supraclavicular adenopathy present.        Left: No supraclavicular adenopathy present.   Neurological: She is alert and oriented to person, place, and time. No cranial nerve deficit. Gait normal.   Skin: No lesion and no rash noted. No cyanosis or erythema. Nails show no clubbing.   Psychiatric: She has a normal mood and affect. Her speech is normal.   Vitals reviewed.        Assessment:       1. Squamous cell carcinoma of tongue  Ambulatory Referral to Hematology/Oncology/Psychology    SLP evaluation   2. Anxiety about health  Ambulatory Referral to  Hematology/Oncology/Psychology   =  Plan:       Ms. 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 There was no indication for neck dissection. She continues to improve and has no evidence of recurrent disease. She has some challenges functionally and psychologically with the sequellae of her disease, so I have placed new referrals to speech therapy and psychosocial oncology. She will return in 6 weeks for another check.

## 2017-08-07 ENCOUNTER — TELEPHONE (OUTPATIENT)
Dept: INFUSION THERAPY | Facility: HOSPITAL | Age: 42
End: 2017-08-07

## 2017-08-09 ENCOUNTER — TELEPHONE (OUTPATIENT)
Dept: INFUSION THERAPY | Facility: HOSPITAL | Age: 42
End: 2017-08-09

## 2017-08-10 ENCOUNTER — CLINICAL SUPPORT (OUTPATIENT)
Dept: SPEECH THERAPY | Facility: HOSPITAL | Age: 42
End: 2017-08-10
Payer: COMMERCIAL

## 2017-08-10 DIAGNOSIS — Z98.890 S/P PARTIAL GLOSSECTOMY: ICD-10-CM

## 2017-08-10 DIAGNOSIS — R47.1 DYSARTHRIA: Primary | ICD-10-CM

## 2017-08-10 DIAGNOSIS — C02.9 TONGUE CANCER: ICD-10-CM

## 2017-08-10 PROCEDURE — 92522 EVALUATE SPEECH PRODUCTION: CPT | Mod: GN | Performed by: SPEECH-LANGUAGE PATHOLOGIST

## 2017-08-10 PROCEDURE — G8999 MOTOR SPEECH CURRENT STATUS: HCPCS | Mod: GN,CI | Performed by: SPEECH-LANGUAGE PATHOLOGIST

## 2017-08-10 PROCEDURE — G9186 MOTOR SPEECH GOAL STATUS: HCPCS | Mod: GN,CH | Performed by: SPEECH-LANGUAGE PATHOLOGIST

## 2017-08-10 NOTE — PATIENT INSTRUCTIONS
"  Go ahead and do these 3x/each to drill the motor pattern:  tsee   See  See you later  tsay   Say  Say it again    tsigh   Sigh  Sign more     So  So much more  tsue   Christytrey Harrison called    yets   Yes    A yes  Vets   ves  Buy a "ves"  Buts   Bus  On the bus  Guts   Ramírez  Call Ramírez  Bets   Rosy, Ting Follow Rosy      For the "flap," over-say the /t/, then relax back in the flap sound (something between a /t/ and /d/).  Latter-ladder  Matter- madder  Batter-badder  Metal-medal  Title-tidal  Later  Water    Start increasing aware diaphragmatic breathing in exercise and practice it by making your belly move more than your chest move with deep breathing.    8/24 at 1:00.  "

## 2017-08-14 ENCOUNTER — TELEPHONE (OUTPATIENT)
Dept: SPEECH THERAPY | Facility: HOSPITAL | Age: 42
End: 2017-08-14

## 2017-08-14 DIAGNOSIS — Z85.810 HISTORY OF TONGUE CANCER: ICD-10-CM

## 2017-08-14 DIAGNOSIS — Z98.890 S/P PARTIAL GLOSSECTOMY: ICD-10-CM

## 2017-08-14 DIAGNOSIS — R47.1 DYSARTHRIA: Primary | ICD-10-CM

## 2017-08-14 NOTE — PROGRESS NOTES
"REFERRING PHYSICIAN:  Maximus Morrow M.D., Head and Neck Surgical Oncologist  LENGTH OF VISIT:  1 hour    REASON FOR REFERRAL:  Ms. Rose Mueller, age 42, was referred by Dr. 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  There has been no change in her medical status and she remains SHONNA.  She was unaccompanied.    Ms. Mueller was originally seen for evaluation 11/22/16 with subsequent speech therapy 2-4x/month through 1/5/17 to address mild dysarthria.  She had spontaneously ceased coming to therapy 1-2 visits before anticipated completion, but this is often a sign that the patient is comfortable with their level of functioning.  Today, she reported that she feels she does well in small settings, but in larger social situations (e.g., party, large professional event), she feels that her speech is still "spitty," requires more effort, and fatigues over time (whether in a small or large group).  She acknowledged that she may be speaking more rapidly in such settings and over background noise.  She finds herself wondering if others understand her or are judging her competence (particularly in her field) based on the quality of her speech.    She reported that she has continued to perform lingual stretches intermittently. She still has a tingly sensation in her tongue, but it is reduced.  She still needs a smaller bite of food for best ability to manage during the oral phase.  Salad remains challenging to chew.    MEDICAL HISTORY:  Past Medical History:   Diagnosis Date    Anxiety about health " "9/6/2016    previously treated for CIRILO    Headache     Hx of psychiatric care     Migraine headache     Psychiatric problem     Sciatica     Therapy      SURGICAL HISTORY:  Past Surgical History:   Procedure Laterality Date    PARTIAL GLOSSECTOMY Right 09/28/2016       TREATMENT HISTORY of problem for which patient is referred:  1. Right partial glossectomy and Right radical tonsillectomy with resection of the anterior tonsil pillar and retromolar trigone, 9/28/2016    Per Dr. Morrow's note of 11/15/17, there was no indication for neck dissection.  They discussed celecoxib as chemoprevention, "but this is not standard and off-label - it may be worth considering."  Per is 7/27/17 note, she continues to improve and has no evidence of recurrence.    SWALLOWING HISTORY:  Regular diet with thin liquids pre-op.  Currently taking essentially regular diet (with thin liquids), but avoiding certain foods that are challenging to manipulate during oral phase (e.g., salad).      FAMILY HISTORY:  Family History   Problem Relation Age of Onset    Cancer Maternal Grandfather     Celiac disease Sister     Crohn's disease Sister     Autoimmune disease Sister      SOCIAL HISTORY:  Ms. Mueller is single and lives alone per the EMR. 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  She enjoys exercise/yoga and socializing/dinner with friends.    Tobacco use:  Never smoker.  ETOH use:  Socially.    BEHAVIOR:  Ms. Mueller remained a very pleasant woman whose affect and social interaction were appropriate for situation and setting.  She was fully cooperative for the assessment. Results are considered indicative of her current levels of speech and swallowing functioning.    HEARING:  Subjectively, within normal limits.    ASSESSMENT:  Oral Peripheral:     Mandible: 45 mm via TheraBite measuring tool 11/22/16; deferred.  She had denied any changes in oral aperture since surgery.   Lips:  Within normal " "limits for  rounding, spreading, and alternating as well as and smacking and repetition of /p/.   Tongue:  Deviates to R on protrusion with L lateral border rising superiorly as she attempted protrusion.  She was able to bring tip to midline with no effort or visual feedback (mirror).  Lateralization to L was adequate, and she could lateralize the R fully; some effort still required.  She needed limited to no assistance from her jaw to elevate and contact alveolar ridge.  She could retroflex with mildly reduced ROM, but adequately to produce "er."  These patterns are essentially unchanged from her initial assessment with the exception of ease of movement and ROM of movement to R.  Further healing noted including decreased swelling as compared to last time she was seen; tongue has narrow appearance.   Velum and Hard Palate:  11/22/16 inspection revealed an asymmetric velum at rest and with abrupt production of "ah."  The R aspect appeared somewhat tethered s/p the tonsillectomy.  She denied nasal regurgitation of liquids or solids.    Deferred today.   Reflexes:  Gag: Present Swallow: Present   Dentition:  Within normal limits for speech and swallowing purposes.   Oropharynx: 11/22/16:  Tethered R anterior tonsillar pillar in conjunction with R velum.    Speech:  Reviewed speech sounds informally.  On a mild lateral distortions of /s/ and /z/ were noted. Inconsistent imprecise flap noted. Conversational speech is 100% intelligible.  Very stimulable to improve central air flow for sibilants and produce more precise flap.    Swallowing:  Deferred direct assessment. Oral phase issues reported above all related to lingual deficits.    Voice/Resonance:  Voice quality within normal limits.  Resonance within normal limits.     THERAPEUTIC INTERVENTION:  Provided review of /s/ and addition of flap stimuli.    IMPRESSIONS:   This 43 yo woman appears to present with   1.  Mild dysarthria.  2.  Mild oral phase dysphagia " secondary to lingual deficits.  3.  S/p R lateral partial glossectomy with closure by secondary intention and Right radical tonsillectomy with resection of the anterior tonsil pillar and retromolar trigone on 9/28/16.  4.  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 Morrow.    G codes:  Motor Speech  Current status: FCM:  LEVEL 6: The individual is successfully able to communicate intelligibly in most activities, but some limitations in intelligibility are still apparent in vocational, avocational, and social activities. The individual rarely requires minimal cueing to produce complex sentences/messages intelligibly. The individual usually uses compensatory strategies when encountering difficulty.   - CI   Projected status:  FCM:   LEVEL 7: The individual¢s ability to successfully and independently participate in vocational, avocational, or social activities is not limited by speech production.  Independent functioning may occasionally include the use of compensatory techniques.   - CH     RECOMMENDATIONS/PLAN OF CARE:  It is felt that Ms. Mueller would benefit from  1.  Speech therapy on a once weekly basis with a home program for 4-6 weeks for improved speech skills.  2.  Continued f/u with Dr. Morrow as directed.    Long-term goals:  Ms. Mueller will be able to   1.  Have improved clarity of speech sounds in conversational speech.  2.  Consume a regular diet with an oral preparatory phase within functional limits with or without appropriate compensatory strategies.    Short-term objectives:  Ms. Mueller will  1.  Present for therapy once weekly for 4-6 weeks.  2.  Perform a home program 2-3x/day by report.  3.  Produce the following phonemes in words, phrases, sentences, and conversational speech with % accuracy:  /s/, /z/, flap.  I certify the need for these services furnished under this plan of treatment and  while under my care.    Patient to follow through with above plan and follow-up with me as scheduled.     Maximus Morrow MD, FACS

## 2017-08-23 ENCOUNTER — TELEPHONE (OUTPATIENT)
Dept: SPEECH THERAPY | Facility: HOSPITAL | Age: 42
End: 2017-08-23

## 2017-08-23 NOTE — TELEPHONE ENCOUNTER
Spoke to Ms. Ami re: rescheduling the appt she had to cancel on tomorrow.  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 Acknowledged; will await her call.

## 2017-09-14 ENCOUNTER — OFFICE VISIT (OUTPATIENT)
Dept: OTOLARYNGOLOGY | Facility: CLINIC | Age: 42
End: 2017-09-14
Payer: COMMERCIAL

## 2017-09-14 VITALS
BODY MASS INDEX: 17.68 KG/M2 | TEMPERATURE: 98 F | SYSTOLIC BLOOD PRESSURE: 154 MMHG | HEART RATE: 65 BPM | DIASTOLIC BLOOD PRESSURE: 64 MMHG | WEIGHT: 106.25 LBS

## 2017-09-14 DIAGNOSIS — C02.9 SQUAMOUS CELL CARCINOMA OF TONGUE: Primary | ICD-10-CM

## 2017-09-14 DIAGNOSIS — R47.1 DYSARTHRIA: ICD-10-CM

## 2017-09-14 PROCEDURE — 99213 OFFICE O/P EST LOW 20 MIN: CPT | Mod: S$GLB,,, | Performed by: OTOLARYNGOLOGY

## 2017-09-14 PROCEDURE — 3008F BODY MASS INDEX DOCD: CPT | Mod: S$GLB,,, | Performed by: OTOLARYNGOLOGY

## 2017-09-14 PROCEDURE — 99999 PR PBB SHADOW E&M-EST. PATIENT-LVL III: CPT | Mod: PBBFAC,,, | Performed by: OTOLARYNGOLOGY

## 2017-09-14 RX ORDER — SPIRONOLACTONE 50 MG/1
50 TABLET, FILM COATED ORAL DAILY
COMMUNITY

## 2017-09-14 NOTE — PROGRESS NOTES
[facility]    Subjective:      Patient ID: Rose Mueller is a 42 y.o. female.    Chief Complaint:  Chief Complaint   Patient presents with    Follow-up     Squamous cell carcinoma of tongue      HPI     TREATMENT HISTORY:  1. Right partial glossectomy, 9/28/2016    [facility] for [facility]-up after her right partial glossectomy. She has some tightness still on the side of the tongue, but this is getting better. She thinks that her speech continues to improve, and she is optimistic about the future. She is trying to slow her speech down a little and stretch it. The tingling has been less over the past couple of weeks. She met with Antonia again, and feels that she is making progress. She has some trouble with lettuce, but otherwise eats whatever she wants. There is stable sensitivity to vinegar/tangy things.     There is no dysphagia, odynophagia, sore throat, or otalgia. She has no adenopathy, fever, chills, or weight loss.  She is a non-smoker. She is eating well and gaining weight in a healthy fashion.     She does not use tobacco products. There have been no interval changes in her health.    Past Medical History:   Diagnosis Date    Anxiety about health 9/6/2016    previously treated for CIRILO    Headache     Hx of psychiatric care     Migraine headache     Psychiatric problem     Sciatica     Therapy        Past Surgical History:   Procedure Laterality Date    PARTIAL GLOSSECTOMY Right 09/28/2016         Current Outpatient Prescriptions:     ascorbic acid, vitamin C, (VITAMIN C) 500 MG tablet, Take 500 mg by mouth once daily., Disp: , Rfl:     CYANOCOBALAMIN, VITAMIN B-12, (VITAMIN B-12 ORAL), Take by mouth., Disp: , Rfl:     lansoprazole (PREVACID) 30 MG capsule, Take 30 mg by mouth once daily., Disp: , Rfl:     minocycline (MINOCIN,DYNACIN) 100 MG capsule, TK ONE C PO  QD, Disp: , Rfl: 1    multivitamin (ONE DAILY MULTIVITAMIN) per  tablet, Take 1 tablet by mouth once daily., Disp: , Rfl:     ondansetron (ZOFRAN-ODT) 4 MG TbDL, Take 1 tablet (4 mg total) by mouth every 8 (eight) hours as needed., Disp: 12 tablet, Rfl: 1    spironolactone (ALDACTONE) 50 MG tablet, Take 50 mg by mouth once daily., Disp: , Rfl:     sumatriptan (IMITREX) 50 MG tablet, TK ONE T PO FOR MIGRAINE HEADACHE. MAY REPEAT I 2 HOURS IF NOT RESOLVED. DO NOT TAKE MORE THAN 2 IN 1 DAY OR 3 IN 1 WEEK, Disp: , Rfl: 0    TRINESSA LO 0.18/0.215/0.25 mg-25 mcg tablet, TK 1 T PO QD, Disp: , Rfl: 9    zolpidem (AMBIEN) 5 MG Tab, Take 5 mg by mouth., Disp: , Rfl:     alprazolam (XANAX XR) 0.5 MG Tb24, , Disp: , Rfl:     Review of patient's allergies indicates:  No Known Allergies    Social History     Social History    Marital status: Single     Spouse name: N/A    Number of children: 0    Years of education: N/A     Occupational History          self employed     Social History Main Topics    Smoking status: Never Smoker    Smokeless tobacco: Never Used    Alcohol use Not on file    Drug use: Unknown    Sexual activity: Not on file     Other Topics Concern    Patient Feels They Ought To Cut Down On Drinking/Drug Use No    Patient Annoyed By Others Criticizing Their Drinking/Drug Use No    Patient Has Felt Bad Or Guilty About Drinking/Drug Use No    Patient Has Had A Drink/Used Drugs As An Eye Opener In The [facility] No     Social History Narrative    She is an , single, lives alone, no children       Family History   Problem Relation Age of Onset    Cancer Maternal Grandfather     Celiac disease Sister     Crohn's disease Sister     Autoimmune disease Sister      Review of Systems   Constitutional: Negative for activity change, appetite change, chills, diaphoresis, fatigue, fever and unexpected weight change.   HENT: Negative for congestion, dental problem, drooling, ear discharge, ear pain, facial swelling, hearing loss, mouth sores, nosebleeds,  postnasal drip, rhinorrhea, sinus pressure, sneezing, sore throat, tinnitus, trouble swallowing and voice change.    Eyes: Negative for pain, discharge, redness, itching and visual disturbance.   Respiratory: Negative for cough, choking and shortness of breath.    Cardiovascular: Negative for chest pain, palpitations and leg swelling.   Gastrointestinal: Negative for abdominal distention, abdominal pain, constipation, diarrhea, nausea and vomiting.   Endocrine: Negative for cold intolerance and heat intolerance.   Genitourinary: Negative for difficulty urinating, dysuria and hematuria.   Musculoskeletal: Negative for arthralgias, back pain, gait problem, myalgias, neck pain and neck stiffness.   Skin: Negative for rash and wound.   Allergic/Immunologic: Negative for environmental allergies and immunocompromised state.   Neurological: Positive for numbness and headaches. Negative for dizziness, facial asymmetry, speech difficulty, weakness and light-headedness.   Hematological: Negative for adenopathy. Bruises/bleeds easily.   Psychiatric/Behavioral: The patient is not nervous/anxious.        Objective:      Physical Exam   Constitutional: She is oriented to person, place, and time. She appears well-developed and well-nourished. She is cooperative. She does not have a sickly appearance. She does not appear ill. No distress.   HENT:   Head: Normocephalic and atraumatic.   Right Ear: Hearing and external ear normal.   Left Ear: Hearing and external ear normal.   Nose: Nose normal. No mucosal edema, rhinorrhea or nasal deformity.   Mouth/Throat: Uvula is midline, oropharynx is clear and moist and mucous membranes are normal. Mucous membranes are not pale, not dry and not cyanotic. She does not have dentures. No oral lesions. No trismus in the jaw. Normal dentition. No uvula swelling or lacerations. No oropharyngeal exudate, posterior oropharyngeal edema, posterior oropharyngeal erythema or tonsillar abscesses.       NP:  No lesions of posterior wall  OP: No lesions of posterior wall or BOT. BOT is soft to palpation  Larynx: No lesions of glottic or supraglottic larynx. Normal vocal fold mobility    HP: No lesions of pyriform sinuses or postcricoid region  Mirror examination was performed.     Eyes: Conjunctivae, EOM and lids are normal. Pupils are equal, round, and reactive to light. Right conjunctiva is not injected. Left conjunctiva is not injected.   Neck: Normal range of motion, full passive range of motion without pain and phonation normal. Neck supple. No JVD present. No tracheal deviation present. No thyroid mass and no thyromegaly present.   Salivary glands - there are no lesions or asymmetric findings in the submandibular or parotid glands     Cardiovascular: Normal rate, regular rhythm and normal pulses.    Pulmonary/Chest: Effort normal. No stridor. No respiratory distress.   Abdominal: She exhibits no distension. There is no guarding.   Musculoskeletal: She exhibits no edema or tenderness.   Lymphadenopathy:        Head (right side): No submental, no submandibular, no tonsillar, no preauricular and no posterior auricular adenopathy present.        Head (left side): No submental, no submandibular, no tonsillar, no preauricular and no posterior auricular adenopathy present.     She has no cervical adenopathy.        Right cervical: No deep cervical and no posterior cervical adenopathy present.       Left cervical: No deep cervical and no posterior cervical adenopathy present.        Right: No supraclavicular adenopathy present.        Left: No supraclavicular adenopathy present.   Neurological: She is alert and oriented to person, place, and time. No cranial nerve deficit. Gait normal.   Skin: No lesion and no rash noted. No cyanosis or erythema. Nails show no clubbing.   Psychiatric: She has a normal mood and affect. Her speech is normal.   Vitals reviewed.        Assessment:       1. Squamous cell carcinoma of tongue      2. Dysarthria       Plan:       Ms. 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 There was no indication for neck dissection. She continues to improve and has no evidence of recurrent disease. She will return in 8 weeks for another check.

## 2017-12-07 ENCOUNTER — OFFICE VISIT (OUTPATIENT)
Dept: OTOLARYNGOLOGY | Facility: CLINIC | Age: 42
End: 2017-12-07
Payer: COMMERCIAL

## 2017-12-07 VITALS
HEART RATE: 76 BPM | SYSTOLIC BLOOD PRESSURE: 119 MMHG | DIASTOLIC BLOOD PRESSURE: 56 MMHG | BODY MASS INDEX: 17.54 KG/M2 | WEIGHT: 105.38 LBS

## 2017-12-07 DIAGNOSIS — C02.9 SQUAMOUS CELL CARCINOMA OF TONGUE: Primary | ICD-10-CM

## 2017-12-07 PROCEDURE — 99999 PR PBB SHADOW E&M-EST. PATIENT-LVL III: CPT | Mod: PBBFAC,,, | Performed by: OTOLARYNGOLOGY

## 2017-12-07 PROCEDURE — 99213 OFFICE O/P EST LOW 20 MIN: CPT | Mod: S$GLB,,, | Performed by: OTOLARYNGOLOGY

## 2017-12-07 RX ORDER — SPIRONOLACTONE 50 MG/1
TABLET, FILM COATED ORAL
COMMUNITY
Start: 2017-09-13 | End: 2017-12-07

## 2017-12-07 NOTE — PROGRESS NOTES
"[facility]    Subjective:      Patient ID: Rose Mueller is a 42 y.o. female.    Chief Complaint:  Chief Complaint   Patient presents with    Follow-up     Squamous cell carcinoma of tongue      HPI     TREATMENT HISTORY:  1. Right partial glossectomy, 9/28/2016    Rose [facility] for [facility]-up after her right partial glossectomy. She has some tightness still on the side of the tongue, but this is getting better. She describes her tongue as "great" today, and has no pain. There is less sensitivity when she brushes her teeth on that side. She thinks that her speech continues to improve. She is trying to slow her speech down a little and stretch it. The tingling has been less over the past couple of weeks. She met with Antonia again, and feels that she is making progress. She has some trouble with lettuce, but otherwise eats whatever she wants. There is stable sensitivity to vinegar/tangy things.     There is no dysphagia, odynophagia, sore throat, or otalgia. She has no adenopathy, fever, chills, or weight loss.  She is a non-smoker. She is eating well and gaining weight in a healthy fashion.     She does not use tobacco products. There have been no interval changes in her health. She is getting ready for a trip to [facility].     Past Medical History:   Diagnosis Date    Anxiety about health 9/6/2016    previously treated for CIRILO    Headache     Hx of psychiatric care     Migraine headache     Psychiatric problem     Sciatica     Therapy        Past Surgical History:   Procedure Laterality Date    PARTIAL GLOSSECTOMY Right 09/28/2016         Current Outpatient Prescriptions:     alprazolam (XANAX XR) 0.5 MG Tb24, , Disp: , Rfl:     ascorbic acid, vitamin C, (VITAMIN C) 500 MG tablet, Take 500 mg by mouth once daily., Disp: , Rfl:     CYANOCOBALAMIN, VITAMIN B-12, (VITAMIN B-12 ORAL), Take by mouth., Disp: , Rfl:     lansoprazole (PREVACID) 30 MG " capsule, Take 30 mg by mouth once daily., Disp: , Rfl:     minocycline (MINOCIN,DYNACIN) 100 MG capsule, TK ONE C PO  QD, Disp: , Rfl: 1    multivitamin (ONE DAILY MULTIVITAMIN) per tablet, Take 1 tablet by mouth once daily., Disp: , Rfl:     ondansetron (ZOFRAN-ODT) 4 MG TbDL, Take 1 tablet (4 mg total) by mouth every 8 (eight) hours as needed., Disp: 12 tablet, Rfl: 1    spironolactone (ALDACTONE) 50 MG tablet, Take 50 mg by mouth once daily., Disp: , Rfl:     sumatriptan (IMITREX) 50 MG tablet, TK ONE T PO FOR MIGRAINE HEADACHE. MAY REPEAT I 2 HOURS IF NOT RESOLVED. DO NOT TAKE MORE THAN 2 IN 1 DAY OR 3 IN 1 WEEK, Disp: , Rfl: 0    TRINESSA LO 0.18/0.215/0.25 mg-25 mcg tablet, TK 1 T PO QD, Disp: , Rfl: 9    zolpidem (AMBIEN) 5 MG Tab, Take 5 mg by mouth., Disp: , Rfl:     Review of patient's allergies indicates:  No Known Allergies    Social History     Social History    Marital status: Single     Spouse name: N/A    Number of children: 0    Years of education: N/A     Occupational History          self employed     Social History Main Topics    Smoking status: Never Smoker    Smokeless tobacco: Never Used    Alcohol use Not on file    Drug use: Unknown    Sexual activity: Not on file     Other Topics Concern    Patient Feels They Ought To Cut Down On Drinking/Drug Use No    Patient Annoyed By Others Criticizing Their Drinking/Drug Use No    Patient Has Felt Bad Or Guilty About Drinking/Drug Use No    Patient Has Had A Drink/Used Drugs As An Eye Opener In The [facility] No     Social History Narrative    She is an , single, lives alone, no children       Family History   Problem Relation Age of Onset    Cancer Maternal Grandfather     Celiac disease Sister     Crohn's disease Sister     Autoimmune disease Sister      Review of Systems   Constitutional: Negative for activity change, appetite change, chills, diaphoresis, fatigue, fever and unexpected weight change.   HENT: Negative  for congestion, dental problem, drooling, ear discharge, ear pain, facial swelling, hearing loss, mouth sores, nosebleeds, postnasal drip, rhinorrhea, sinus pressure, sneezing, sore throat, tinnitus, trouble swallowing and voice change.    Eyes: Negative for pain, discharge, redness, itching and visual disturbance.   Respiratory: Negative for cough, choking and shortness of breath.    Cardiovascular: Negative for chest pain, palpitations and leg swelling.   Gastrointestinal: Negative for abdominal distention, abdominal pain, constipation, diarrhea, nausea and vomiting.   Endocrine: Negative for cold intolerance and heat intolerance.   Genitourinary: Negative for difficulty urinating, dysuria and hematuria.   Musculoskeletal: Negative for arthralgias, back pain, gait problem, myalgias, neck pain and neck stiffness.   Skin: Negative for rash and wound.   Allergic/Immunologic: Negative for environmental allergies and immunocompromised state.   Neurological: Positive for numbness and headaches. Negative for dizziness, facial asymmetry, speech difficulty, weakness and light-headedness.   Hematological: Negative for adenopathy. Bruises/bleeds easily.   Psychiatric/Behavioral: The patient is not nervous/anxious.        Objective:      Physical Exam   Constitutional: She is oriented to person, place, and time. She appears well-developed and well-nourished. She is cooperative. She does not have a sickly appearance. She does not appear ill. No distress.   HENT:   Head: Normocephalic and atraumatic.   Right Ear: Hearing and external ear normal.   Left Ear: Hearing and external ear normal.   Nose: Nose normal. No mucosal edema, rhinorrhea or nasal deformity.   Mouth/Throat: Uvula is midline, oropharynx is clear and moist and mucous membranes are normal. Mucous membranes are not pale, not dry and not cyanotic. She does not have dentures. No oral lesions. No trismus in the jaw. Normal dentition. No uvula swelling or lacerations.  No oropharyngeal exudate, posterior oropharyngeal edema, posterior oropharyngeal erythema or tonsillar abscesses.       NP: No lesions of posterior wall  OP: No lesions of posterior wall or BOT. BOT is soft to palpation  Larynx: No lesions of glottic or supraglottic larynx. Normal vocal fold mobility    HP: No lesions of pyriform sinuses or postcricoid region  Mirror examination was performed.     Eyes: Conjunctivae, EOM and lids are normal. Pupils are equal, round, and reactive to light. Right conjunctiva is not injected. Left conjunctiva is not injected.   Neck: Normal range of motion, full passive range of motion without pain and phonation normal. Neck supple. No JVD present. No tracheal deviation present. No thyroid mass and no thyromegaly present.   Salivary glands - there are no lesions or asymmetric findings in the submandibular or parotid glands     Cardiovascular: Normal rate, regular rhythm and normal pulses.    Pulmonary/Chest: Effort normal. No stridor. No respiratory distress.   Abdominal: She exhibits no distension. There is no guarding.   Musculoskeletal: She exhibits no edema or tenderness.   Lymphadenopathy:        Head (right side): No submental, no submandibular, no tonsillar, no preauricular and no posterior auricular adenopathy present.        Head (left side): No submental, no submandibular, no tonsillar, no preauricular and no posterior auricular adenopathy present.     She has no cervical adenopathy.        Right cervical: No deep cervical and no posterior cervical adenopathy present.       Left cervical: No deep cervical and no posterior cervical adenopathy present.        Right: No supraclavicular adenopathy present.        Left: No supraclavicular adenopathy present.   Neurological: She is alert and oriented to person, place, and time. No cranial nerve deficit. Gait normal.   Skin: No lesion and no rash noted. No cyanosis or erythema. Nails show no clubbing.   Psychiatric: She has a normal  mood and affect. Her speech is normal.   Vitals reviewed.        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 There was no indication for neck dissection. She continues to improve and has no evidence of recurrent disease. She will return in 8 weeks for another check.

## 2017-12-07 NOTE — ASSESSMENT & PLAN NOTE
Ms. 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 There was no indication for neck dissection. She continues to improve and has no evidence of recurrent disease. She will return in 8 weeks for another check.

## 2018-02-01 ENCOUNTER — OFFICE VISIT (OUTPATIENT)
Dept: OTOLARYNGOLOGY | Facility: CLINIC | Age: 43
End: 2018-02-01
Payer: COMMERCIAL

## 2018-02-01 VITALS
TEMPERATURE: 98 F | HEART RATE: 63 BPM | WEIGHT: 105.63 LBS | DIASTOLIC BLOOD PRESSURE: 70 MMHG | BODY MASS INDEX: 17.57 KG/M2 | SYSTOLIC BLOOD PRESSURE: 128 MMHG

## 2018-02-01 DIAGNOSIS — C02.9 SQUAMOUS CELL CARCINOMA OF TONGUE: ICD-10-CM

## 2018-02-01 PROCEDURE — 99999 PR PBB SHADOW E&M-EST. PATIENT-LVL III: CPT | Mod: PBBFAC,,, | Performed by: OTOLARYNGOLOGY

## 2018-02-01 PROCEDURE — 99213 OFFICE O/P EST LOW 20 MIN: CPT | Mod: S$GLB,,, | Performed by: OTOLARYNGOLOGY

## 2018-02-01 PROCEDURE — 3008F BODY MASS INDEX DOCD: CPT | Mod: S$GLB,,, | Performed by: OTOLARYNGOLOGY

## 2018-02-01 NOTE — PROGRESS NOTES
"[facility]    Subjective:      Patient ID: Rose Mueller is a 42 y.o. female.    Chief Complaint:  Chief Complaint   Patient presents with    Follow-up     Squamous cell carcinoma of tongue      HPI     TREATMENT HISTORY:  1. Right partial glossectomy, 9/28/2016    [facility] for [facility]-up after her right partial glossectomy. She has some tightness still on the side of the tongue, but this is getting better. She describes her tongue as "great", and has no pain. She thinks that her speech continues to improve. She is trying to slow her speech down a little and stretch it. The tingling has been less. She is eating lettuce and other ruffage.     There is no dysphagia, odynophagia, sore throat, or otalgia. She has no adenopathy, fever, chills, or weight loss.  She is a non-smoker. She is eating well and gaining weight in a healthy fashion.     She does not use tobacco products. There have been no interval changes in her health.     Past Medical History:   Diagnosis Date    Anxiety about health 9/6/2016    previously treated for CIRILO    Headache     Hx of psychiatric care     Migraine headache     Psychiatric problem     Sciatica     Therapy        Past Surgical History:   Procedure Laterality Date    PARTIAL GLOSSECTOMY Right 09/28/2016         Current Outpatient Prescriptions:     alprazolam (XANAX XR) 0.5 MG Tb24, , Disp: , Rfl:     ascorbic acid, vitamin C, (VITAMIN C) 500 MG tablet, Take 500 mg by mouth once daily., Disp: , Rfl:     CYANOCOBALAMIN, VITAMIN B-12, (VITAMIN B-12 ORAL), Take by mouth., Disp: , Rfl:     lansoprazole (PREVACID) 30 MG capsule, Take 30 mg by mouth once daily., Disp: , Rfl:     minocycline (MINOCIN,DYNACIN) 100 MG capsule, TK ONE C PO  QD, Disp: , Rfl: 1    multivitamin (ONE DAILY MULTIVITAMIN) per tablet, Take 1 tablet by mouth once daily., Disp: , Rfl:     ondansetron (ZOFRAN-ODT) 4 MG TbDL, Take 1 tablet (4 " mg total) by mouth every 8 (eight) hours as needed., Disp: 12 tablet, Rfl: 1    spironolactone (ALDACTONE) 50 MG tablet, Take 50 mg by mouth once daily., Disp: , Rfl:     sumatriptan (IMITREX) 50 MG tablet, TK ONE T PO FOR MIGRAINE HEADACHE. MAY REPEAT I 2 HOURS IF NOT RESOLVED. DO NOT TAKE MORE THAN 2 IN 1 DAY OR 3 IN 1 WEEK, Disp: , Rfl: 0    TRINESSA LO 0.18/0.215/0.25 mg-25 mcg tablet, TK 1 T PO QD, Disp: , Rfl: 9    Review of patient's allergies indicates:  No Known Allergies    Social History     Social History    Marital status: Single     Spouse name: N/A    Number of children: 0    Years of education: N/A     Occupational History          self employed     Social History Main Topics    Smoking status: Never Smoker    Smokeless tobacco: Never Used    Alcohol use Not on file    Drug use: Unknown    Sexual activity: Not on file     Other Topics Concern    Patient Feels They Ought To Cut Down On Drinking/Drug Use No    Patient Annoyed By Others Criticizing Their Drinking/Drug Use No    Patient Has Felt Bad Or Guilty About Drinking/Drug Use No    Patient Has Had A Drink/Used Drugs As An Eye Opener In The [facility] No     Social History Narrative    She is an , single, lives alone, no children       Family History   Problem Relation Age of Onset    Cancer Maternal Grandfather     Celiac disease Sister     Crohn's disease Sister     Autoimmune disease Sister      Review of Systems   Constitutional: Negative for activity change, appetite change, chills, diaphoresis, fatigue, fever and unexpected weight change.   HENT: Negative for congestion, dental problem, drooling, ear discharge, ear pain, facial swelling, hearing loss, mouth sores, nosebleeds, postnasal drip, rhinorrhea, sinus pressure, sneezing, sore throat, tinnitus, trouble swallowing and voice change.    Eyes: Negative for pain, discharge, redness, itching and visual disturbance.   Respiratory: Negative for cough, choking and  shortness of breath.    Cardiovascular: Negative for chest pain, palpitations and leg swelling.   Gastrointestinal: Negative for abdominal distention, abdominal pain, constipation, diarrhea, nausea and vomiting.   Endocrine: Negative for cold intolerance and heat intolerance.   Genitourinary: Negative for difficulty urinating, dysuria and hematuria.   Musculoskeletal: Negative for arthralgias, back pain, gait problem, myalgias, neck pain and neck stiffness.   Skin: Negative for rash and wound.   Allergic/Immunologic: Negative for environmental allergies and immunocompromised state.   Neurological: Positive for numbness and headaches. Negative for dizziness, facial asymmetry, speech difficulty, weakness and light-headedness.   Hematological: Negative for adenopathy. Bruises/bleeds easily.   Psychiatric/Behavioral: The patient is not nervous/anxious.        Objective:      Physical Exam   Constitutional: She is oriented to person, place, and time. She appears well-developed and well-nourished. She is cooperative. She does not have a sickly appearance. She does not appear ill. No distress.   HENT:   Head: Normocephalic and atraumatic.   Right Ear: Hearing and external ear normal.   Left Ear: Hearing and external ear normal.   Nose: Nose normal. No mucosal edema, rhinorrhea or nasal deformity.   Mouth/Throat: Uvula is midline, oropharynx is clear and moist and mucous membranes are normal. Mucous membranes are not pale, not dry and not cyanotic. She does not have dentures. No oral lesions. No trismus in the jaw. Normal dentition. No uvula swelling or lacerations. No oropharyngeal exudate, posterior oropharyngeal edema, posterior oropharyngeal erythema or tonsillar abscesses.       NP: No lesions of posterior wall  OP: No lesions of posterior wall or BOT. BOT is soft to palpation  Larynx: No lesions of glottic or supraglottic larynx. Normal vocal fold mobility    HP: No lesions of pyriform sinuses or postcricoid  region  Mirror examination was performed.     Eyes: Conjunctivae, EOM and lids are normal. Pupils are equal, round, and reactive to light. Right conjunctiva is not injected. Left conjunctiva is not injected.   Neck: Normal range of motion, full passive range of motion without pain and phonation normal. Neck supple. No JVD present. No tracheal deviation present. No thyroid mass and no thyromegaly present.   Salivary glands - there are no lesions or asymmetric findings in the submandibular or parotid glands     Cardiovascular: Normal rate, regular rhythm and normal pulses.    Pulmonary/Chest: Effort normal. No stridor. No respiratory distress.   Abdominal: She exhibits no distension. There is no guarding.   Musculoskeletal: She exhibits no edema or tenderness.   Lymphadenopathy:        Head (right side): No submental, no submandibular, no tonsillar, no preauricular and no posterior auricular adenopathy present.        Head (left side): No submental, no submandibular, no tonsillar, no preauricular and no posterior auricular adenopathy present.     She has no cervical adenopathy.        Right cervical: No deep cervical and no posterior cervical adenopathy present.       Left cervical: No deep cervical and no posterior cervical adenopathy present.        Right: No supraclavicular adenopathy present.        Left: No supraclavicular adenopathy present.   Neurological: She is alert and oriented to person, place, and time. No cranial nerve deficit. Gait normal.   Skin: No lesion and no rash noted. No cyanosis or erythema. Nails show no clubbing.   Psychiatric: She has a normal mood and affect. Her speech is normal.   Vitals reviewed.        Assessment & Plan:       Problem List Items Addressed This Visit     Squamous cell carcinoma of tongue     Ms. 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 There was no indication for neck dissection. She continues to improve and has no evidence of  recurrent disease. She will return in 8 weeks for another check.

## 2018-04-03 ENCOUNTER — OFFICE VISIT (OUTPATIENT)
Dept: OTOLARYNGOLOGY | Facility: CLINIC | Age: 43
End: 2018-04-03
Payer: COMMERCIAL

## 2018-04-03 VITALS
DIASTOLIC BLOOD PRESSURE: 53 MMHG | SYSTOLIC BLOOD PRESSURE: 109 MMHG | WEIGHT: 107.56 LBS | BODY MASS INDEX: 17.9 KG/M2 | TEMPERATURE: 99 F | HEART RATE: 65 BPM

## 2018-04-03 DIAGNOSIS — C02.9 SQUAMOUS CELL CARCINOMA OF TONGUE: Primary | ICD-10-CM

## 2018-04-03 PROCEDURE — 99213 OFFICE O/P EST LOW 20 MIN: CPT | Mod: S$GLB,,, | Performed by: OTOLARYNGOLOGY

## 2018-04-03 PROCEDURE — 99999 PR PBB SHADOW E&M-EST. PATIENT-LVL III: CPT | Mod: PBBFAC,,, | Performed by: OTOLARYNGOLOGY

## 2018-04-03 NOTE — PROGRESS NOTES
"[facility]    Subjective:      Patient ID: Rose Mueller is a 42 y.o. female.    Chief Complaint:  Chief Complaint   Patient presents with    Follow-up     Squamous cell carcinoma of tongue      HPI     TREATMENT HISTORY:  1. Right partial glossectomy, 9/28/2016    [facility]-up after her right partial glossectomy. She has some tightness still on the side of the tongue, but this is getting better. She describes her tongue as "great", and has no pain. She thinks that her speech continues to improve. She is trying to slow her speech down a little and stretch it.     There is no dysphagia, odynophagia, sore throat, or otalgia. She has no adenopathy, fever, chills, or weight loss.  She is a non-smoker. She is eating well.     She does not use tobacco products. There have been no interval changes in her health.     Past Medical History:   Diagnosis Date    Anxiety about health 9/6/2016    previously treated for CIRILO    Headache     Hx of psychiatric care     Migraine headache     Psychiatric problem     Sciatica     Therapy        Past Surgical History:   Procedure Laterality Date    PARTIAL GLOSSECTOMY Right 09/28/2016         Current Outpatient Prescriptions:     alprazolam (XANAX XR) 0.5 MG Tb24, , Disp: , Rfl:     ascorbic acid, vitamin C, (VITAMIN C) 500 MG tablet, Take 500 mg by mouth once daily., Disp: , Rfl:     CYANOCOBALAMIN, VITAMIN B-12, (VITAMIN B-12 ORAL), Take by mouth., Disp: , Rfl:     lansoprazole (PREVACID) 30 MG capsule, Take 30 mg by mouth once daily., Disp: , Rfl:     minocycline (MINOCIN,DYNACIN) 100 MG capsule, TK ONE C PO  QD, Disp: , Rfl: 1    multivitamin (ONE DAILY MULTIVITAMIN) per tablet, Take 1 tablet by mouth once daily., Disp: , Rfl:     ondansetron (ZOFRAN-ODT) 4 MG TbDL, Take 1 tablet (4 mg total) by mouth every 8 (eight) hours as needed., Disp: 12 tablet, Rfl: 1    spironolactone (ALDACTONE) " 50 MG tablet, Take 50 mg by mouth once daily., Disp: , Rfl:     sumatriptan (IMITREX) 50 MG tablet, TK ONE T PO FOR MIGRAINE HEADACHE. MAY REPEAT I 2 HOURS IF NOT RESOLVED. DO NOT TAKE MORE THAN 2 IN 1 DAY OR 3 IN 1 WEEK, Disp: , Rfl: 0    TRINESSA LO 0.18/0.215/0.25 mg-25 mcg tablet, TK 1 T PO QD, Disp: , Rfl: 9    Review of patient's allergies indicates:  No Known Allergies    Social History     Social History    Marital status: Single     Spouse name: N/A    Number of children: 0    Years of education: N/A     Occupational History          self employed     Social History Main Topics    Smoking status: Never Smoker    Smokeless tobacco: Never Used    Alcohol use Not on file    Drug use: Unknown    Sexual activity: Not on file     Other Topics Concern    Patient Feels They Ought To Cut Down On Drinking/Drug Use No    Patient Annoyed By Others Criticizing Their Drinking/Drug Use No    Patient Has Felt Bad Or Guilty About Drinking/Drug Use No    Patient Has Had A Drink/Used Drugs As An Eye Opener In The [facility] No     Social History Narrative    She is an , single, lives alone, no children       Family History   Problem Relation Age of Onset    Cancer Maternal Grandfather     Celiac disease Sister     Crohn's disease Sister     Autoimmune disease Sister      Review of Systems   Constitutional: Negative for activity change, appetite change, chills, diaphoresis, fatigue, fever and unexpected weight change.   HENT: Negative for congestion, dental problem, drooling, ear discharge, ear pain, facial swelling, hearing loss, mouth sores, nosebleeds, postnasal drip, rhinorrhea, sinus pressure, sneezing, sore throat, tinnitus, trouble swallowing and voice change.    Respiratory: Negative for cough, choking and shortness of breath.    Cardiovascular: Negative for chest pain, palpitations and leg swelling.   Musculoskeletal: Negative for gait problem.   Allergic/Immunologic: Negative for  environmental allergies and immunocompromised state.   Neurological: Positive for numbness and headaches. Negative for dizziness, facial asymmetry, speech difficulty, weakness and light-headedness.   Hematological: Negative for adenopathy. Bruises/bleeds easily.   Psychiatric/Behavioral: The patient is not nervous/anxious.        Objective:      Physical Exam   Constitutional: She is oriented to person, place, and time. She appears well-developed and well-nourished. She is cooperative. She does not have a sickly appearance. She does not appear ill. No distress.   HENT:   Head: Normocephalic and atraumatic.   Right Ear: Hearing and external ear normal.   Left Ear: Hearing and external ear normal.   Nose: Nose normal. No mucosal edema, rhinorrhea or nasal deformity.   Mouth/Throat: Uvula is midline, oropharynx is clear and moist and mucous membranes are normal. Mucous membranes are not pale, not dry and not cyanotic. She does not have dentures. No oral lesions. No trismus in the jaw. Normal dentition. No uvula swelling or lacerations. No oropharyngeal exudate, posterior oropharyngeal edema, posterior oropharyngeal erythema or tonsillar abscesses.              Eyes: Conjunctivae, EOM and lids are normal. Pupils are equal, round, and reactive to light. Right conjunctiva is not injected. Left conjunctiva is not injected.   Neck: Normal range of motion, full passive range of motion without pain and phonation normal. Neck supple. No JVD present. No tracheal deviation present. No thyroid mass and no thyromegaly present.   Salivary glands - there are no lesions or asymmetric findings in the submandibular or parotid glands     Cardiovascular: Normal rate, regular rhythm and normal pulses.    Pulmonary/Chest: Effort normal. No stridor. No respiratory distress.   Abdominal: She exhibits no distension. There is no guarding.   Musculoskeletal: She exhibits no edema or tenderness.   Lymphadenopathy:        Head (right side): No  submental, no submandibular, no tonsillar, no preauricular and no posterior auricular adenopathy present.        Head (left side): No submental, no submandibular, no tonsillar, no preauricular and no posterior auricular adenopathy present.     She has no cervical adenopathy.        Right cervical: No deep cervical and no posterior cervical adenopathy present.       Left cervical: No deep cervical and no posterior cervical adenopathy present.        Right: No supraclavicular adenopathy present.        Left: No supraclavicular adenopathy present.   Neurological: She is alert and oriented to person, place, and time. No cranial nerve deficit. Gait normal.   Skin: No lesion and no rash noted. No cyanosis or erythema. Nails show no clubbing.   Psychiatric: She has a normal mood and affect. Her speech is normal.   Vitals reviewed.        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 There was no indication for neck dissection. She continues to improve and has no evidence of recurrent disease. She will return in 8 weeks for another check.

## 2018-05-23 ENCOUNTER — PATIENT MESSAGE (OUTPATIENT)
Dept: OTOLARYNGOLOGY | Facility: CLINIC | Age: 43
End: 2018-05-23

## 2018-05-25 ENCOUNTER — OFFICE VISIT (OUTPATIENT)
Dept: OTOLARYNGOLOGY | Facility: CLINIC | Age: 43
End: 2018-05-25
Payer: COMMERCIAL

## 2018-05-25 ENCOUNTER — TELEPHONE (OUTPATIENT)
Dept: OTOLARYNGOLOGY | Facility: CLINIC | Age: 43
End: 2018-05-25

## 2018-05-25 VITALS
BODY MASS INDEX: 17.83 KG/M2 | WEIGHT: 107.13 LBS | HEART RATE: 65 BPM | TEMPERATURE: 98 F | DIASTOLIC BLOOD PRESSURE: 60 MMHG | SYSTOLIC BLOOD PRESSURE: 123 MMHG

## 2018-05-25 DIAGNOSIS — R59.1 LYMPHADENOPATHY: ICD-10-CM

## 2018-05-25 DIAGNOSIS — C02.9 SQUAMOUS CELL CARCINOMA OF TONGUE: Primary | ICD-10-CM

## 2018-05-25 PROCEDURE — 3008F BODY MASS INDEX DOCD: CPT | Mod: CPTII,S$GLB,, | Performed by: OTOLARYNGOLOGY

## 2018-05-25 PROCEDURE — 99213 OFFICE O/P EST LOW 20 MIN: CPT | Mod: S$GLB,,, | Performed by: OTOLARYNGOLOGY

## 2018-05-25 PROCEDURE — 99999 PR PBB SHADOW E&M-EST. PATIENT-LVL III: CPT | Mod: PBBFAC,,, | Performed by: OTOLARYNGOLOGY

## 2018-05-25 NOTE — TELEPHONE ENCOUNTER
----- Message from Sydni Tanner sent at 5/25/2018  8:36 AM CDT -----  Contact: self  Pt stated that she found some swollen glands in her neck that feel like lumps.  Pt stated that she has emailed Dr Morrow and since the email she has found another lump in her throat.    Pt is very concerned and would like to be seen today.  Please call pt to schedule or advise.    Pt can be reached at [phone]

## 2018-06-09 PROBLEM — R59.1 LYMPHADENOPATHY: Status: ACTIVE | Noted: 2018-06-09

## 2018-06-09 NOTE — PROGRESS NOTES
"[facility]    Subjective:      Patient ID: Rose Mueller is a 42 y.o. female.    Chief Complaint:  Chief Complaint   Patient presents with    new lump check      HPI     TREATMENT HISTORY:  1. Right partial glossectomy, 9/28/2016    Rose Mueller [facility] for follow-up after her right partial glossectomy. She has some tightness still on the side of the tongue, but this is getting better. She describes her tongue as "great", and has no pain. She returns today because of the recent development of some nodules behind her ear and in the submental region. These appeared spontaneously, are not red, and have only been present for 1-2 days. They are not enlarging. She may have had an antecedent URI - she has been busy at work.     There is no dysphagia, odynophagia, sore throat, or otalgia. She has no adenopathy, fever, chills, or weight loss.  She is a non-smoker. She is eating well.     She does not use tobacco products.     Past Medical History:   Diagnosis Date    Anxiety about health 9/6/2016    previously treated for CIRILO    Headache     Hx of psychiatric care     Migraine headache     Psychiatric problem     Sciatica     Therapy        Past Surgical History:   Procedure Laterality Date    PARTIAL GLOSSECTOMY Right 09/28/2016         Current Outpatient Prescriptions:     alprazolam (XANAX XR) 0.5 MG Tb24, , Disp: , Rfl:     ascorbic acid, vitamin C, (VITAMIN C) 500 MG tablet, Take 500 mg by mouth once daily., Disp: , Rfl:     CYANOCOBALAMIN, VITAMIN B-12, (VITAMIN B-12 ORAL), Take by mouth., Disp: , Rfl:     lansoprazole (PREVACID) 30 MG capsule, Take 30 mg by mouth once daily., Disp: , Rfl:     minocycline (MINOCIN,DYNACIN) 100 MG capsule, TK ONE C PO  QD, Disp: , Rfl: 1    multivitamin (ONE DAILY MULTIVITAMIN) per tablet, Take 1 tablet by mouth once daily., Disp: , Rfl:     ondansetron (ZOFRAN-ODT) 4 MG TbDL, Take 1 tablet (4 mg total) by mouth " every 8 (eight) hours as needed., Disp: 12 tablet, Rfl: 1    spironolactone (ALDACTONE) 50 MG tablet, Take 50 mg by mouth once daily., Disp: , Rfl:     sumatriptan (IMITREX) 50 MG tablet, TK ONE T PO FOR MIGRAINE HEADACHE. MAY REPEAT I 2 HOURS IF NOT RESOLVED. DO NOT TAKE MORE THAN 2 IN 1 DAY OR 3 IN 1 WEEK, Disp: , Rfl: 0    TRINESSA LO 0.18/0.215/0.25 mg-25 mcg tablet, TK 1 T PO QD, Disp: , Rfl: 9    Review of patient's allergies indicates:  No Known Allergies    Social History     Social History    Marital status: Single     Spouse name: N/A    Number of children: 0    Years of education: N/A     Occupational History          self employed     Social History Main Topics    Smoking status: Never Smoker    Smokeless tobacco: Never Used    Alcohol use Not on file    Drug use: Unknown    Sexual activity: Not on file     Other Topics Concern    Patient Feels They Ought To Cut Down On Drinking/Drug Use No    Patient Annoyed By Others Criticizing Their Drinking/Drug Use No    Patient Has Felt Bad Or Guilty About Drinking/Drug Use No    Patient Has Had A Drink/Used Drugs As An Eye Opener In The [facility] No     Social History Narrative    She is an , single, lives alone, no children       Family History   Problem Relation Age of Onset    Cancer Maternal Grandfather     Celiac disease Sister     Crohn's disease Sister     Autoimmune disease Sister      Review of Systems   Constitutional: Negative for activity change, appetite change, chills, diaphoresis, fatigue, fever and unexpected weight change.   HENT: Negative for congestion, dental problem, drooling, ear discharge, ear pain, facial swelling, hearing loss, mouth sores, nosebleeds, postnasal drip, rhinorrhea, sinus pressure, sneezing, sore throat, tinnitus, trouble swallowing and voice change.    Respiratory: Negative for cough, choking and shortness of breath.    Cardiovascular: Negative for chest pain, palpitations and leg swelling.    Musculoskeletal: Negative for gait problem.   Allergic/Immunologic: Negative for environmental allergies and immunocompromised state.   Neurological: Positive for numbness and headaches. Negative for dizziness, facial asymmetry, speech difficulty, weakness and light-headedness.   Hematological: Negative for adenopathy. Bruises/bleeds easily.   Psychiatric/Behavioral: The patient is not nervous/anxious.        Objective:      Physical Exam   Constitutional: She is oriented to person, place, and time. She appears well-developed and well-nourished. She is cooperative. She does not have a sickly appearance. She does not appear ill. No distress.   HENT:   Head: Normocephalic and atraumatic.   Right Ear: Hearing and external ear normal.   Left Ear: Hearing and external ear normal.   Nose: Nose normal. No mucosal edema, rhinorrhea or nasal deformity.   Mouth/Throat: Uvula is midline, oropharynx is clear and moist and mucous membranes are normal. Mucous membranes are not pale, not dry and not cyanotic. She does not have dentures. No oral lesions. No trismus in the jaw. Normal dentition. No uvula swelling or lacerations. No oropharyngeal exudate, posterior oropharyngeal edema, posterior oropharyngeal erythema or tonsillar abscesses.              Eyes: Conjunctivae, EOM and lids are normal. Pupils are equal, round, and reactive to light. Right conjunctiva is not injected. Left conjunctiva is not injected.   Neck: Normal range of motion, full passive range of motion without pain and phonation normal. Neck supple. No JVD present. No tracheal deviation present. No thyroid mass and no thyromegaly present.   Salivary glands - there are no lesions or asymmetric findings in the submandibular or parotid glands     Cardiovascular: Normal rate, regular rhythm and normal pulses.    Pulmonary/Chest: Effort normal. No stridor. No respiratory distress.   Abdominal: She exhibits no distension. There is no guarding.   Musculoskeletal: She  exhibits no edema or tenderness.   Lymphadenopathy:        Head (right side): Occipital adenopathy present. No submandibular, no tonsillar, no preauricular and no posterior auricular adenopathy present.        Head (left side): No submental, no submandibular, no tonsillar, no preauricular and no posterior auricular adenopathy present.     She has cervical adenopathy.        Right cervical: No deep cervical and no posterior cervical adenopathy present.       Left cervical: No deep cervical and no posterior cervical adenopathy present.        Right: No supraclavicular adenopathy present.        Left: No supraclavicular adenopathy present.   There is a 2-3mm lymph node overlying her mastoid tip on the right, as well as 2 adjacent 3mm nodes in the submental triangle. They are mobile and nontender.    Neurological: She is alert and oriented to person, place, and time. No cranial nerve deficit. Gait normal.   Skin: No lesion and no rash noted. No cyanosis or erythema. Nails show no clubbing.   Psychiatric: She has a normal mood and affect. Her speech is normal.   Vitals reviewed.                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 We discussed the function and manifestations of lymph nodes in the neck, but I noted that I have a higher level of concern for these nodes because of her history of early tongue cancer - the retroauricular nodes do not make sense in that context and are therefore actually reassuring. However, if they are larger or still present in 2 weeks when she returns from a trip, she agreed to contact me and we would evaluate them further with imaging and even a biopsy if needed.    She expressed understanding.

## 2018-06-09 NOTE — ASSESSMENT & PLAN NOTE
This appears to be reactive, based on the short duration of existence and the lack of any change in the primary site. We discussed the function and manifestations of lymph nodes in the neck, but I noted that I have a higher level of concern for these nodes because of her history of early tongue cancer - the retroauricular nodes do not make sense in that context and are therefore actually reassuring. However, if they are larger or still present in 2 weeks when she returns from a trip, she agreed to contact me and we would evaluate them further with imaging and even a biopsy if needed.    She expressed understanding.

## 2018-10-30 ENCOUNTER — OFFICE VISIT (OUTPATIENT)
Dept: OTOLARYNGOLOGY | Facility: CLINIC | Age: 43
End: 2018-10-30
Payer: COMMERCIAL

## 2018-10-30 VITALS
WEIGHT: 106.06 LBS | DIASTOLIC BLOOD PRESSURE: 64 MMHG | HEART RATE: 60 BPM | BODY MASS INDEX: 17.65 KG/M2 | SYSTOLIC BLOOD PRESSURE: 109 MMHG

## 2018-10-30 DIAGNOSIS — C02.9 SQUAMOUS CELL CARCINOMA OF TONGUE: ICD-10-CM

## 2018-10-30 PROCEDURE — 99213 OFFICE O/P EST LOW 20 MIN: CPT | Mod: S$GLB,,, | Performed by: OTOLARYNGOLOGY

## 2018-10-30 PROCEDURE — 3008F BODY MASS INDEX DOCD: CPT | Mod: CPTII,S$GLB,, | Performed by: OTOLARYNGOLOGY

## 2018-10-30 PROCEDURE — 99999 PR PBB SHADOW E&M-EST. PATIENT-LVL III: CPT | Mod: PBBFAC,,, | Performed by: OTOLARYNGOLOGY

## 2018-10-30 NOTE — ASSESSMENT & PLAN NOTE
Ms. 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 There was no indication for neck dissection. She continues to improve and has no evidence of recurrent disease. Her lymph nodes are stable.

## 2018-10-30 NOTE — PROGRESS NOTES
"[facility]    Subjective:      Patient ID: Rose Mueller is a 43 y.o. female.    Chief Complaint:  Chief Complaint   Patient presents with    Follow-up     Squamous cell carcinoma of tongue      HPI     TREATMENT HISTORY:  1. Right partial glossectomy, 9/28/2016    [facility]-up after her right partial glossectomy. She has some tightness still on the side of the tongue, but this is getting better. She describes her tongue as "great", and has no pain. She has no issues or changes. The lymph nodes are mostly better - she thinks that the area behind her ear persists, but is unchanged.    There is no dysphagia, odynophagia, sore throat, or otalgia. She has no adenopathy, fever, chills, or weight loss.  She is a non-smoker. She is eating well.     She does not use tobacco products.     Past Medical History:   Diagnosis Date    Anxiety about health 9/6/2016    previously treated for CIRILO    Headache     Hx of psychiatric care     Migraine headache     Psychiatric problem     Sciatica     Therapy        Past Surgical History:   Procedure Laterality Date    GLOSSECTOMY-PARTIAL Right 9/28/2016    Performed by Maximus Morrow MD at [facility] OR 2ND FLR    PARTIAL GLOSSECTOMY Right 09/28/2016         Current Outpatient Medications:     spironolactone (ALDACTONE) 50 MG tablet, Take 50 mg by mouth once daily., Disp: , Rfl:     sumatriptan (IMITREX) 50 MG tablet, TK ONE T PO FOR MIGRAINE HEADACHE. MAY REPEAT I 2 HOURS IF NOT RESOLVED. DO NOT TAKE MORE THAN 2 IN 1 DAY OR 3 IN 1 WEEK, Disp: , Rfl: 0    alprazolam (XANAX XR) 0.5 MG Tb24, , Disp: , Rfl:     ascorbic acid, vitamin C, (VITAMIN C) 500 MG tablet, Take 500 mg by mouth once daily., Disp: , Rfl:     CYANOCOBALAMIN, VITAMIN B-12, (VITAMIN B-12 ORAL), Take by mouth., Disp: , Rfl:     lansoprazole (PREVACID) 30 MG capsule, Take 30 mg by mouth once daily., Disp: , Rfl:     minocycline " (MINOCIN,DYNACIN) 100 MG capsule, TK ONE C PO  QD, Disp: , Rfl: 1    multivitamin (ONE DAILY MULTIVITAMIN) per tablet, Take 1 tablet by mouth once daily., Disp: , Rfl:     TRINESSA LO 0.18/0.215/0.25 mg-25 mcg tablet, TK 1 T PO QD, Disp: , Rfl: 9    Review of patient's allergies indicates:  No Known Allergies    Social History     Socioeconomic History    Marital status: Single     Spouse name: Not on file    Number of children: 0    Years of education: Not on file    Highest education level: Not on file   Social Needs    Financial resource strain: Not on file    Food insecurity - worry: Not on file    Food insecurity - inability: Not on file    Transportation needs - medical: Not on file    Transportation needs - non-medical: Not on file   Occupational History     Comment: self employed   Tobacco Use    Smoking status: Never Smoker    Smokeless tobacco: Never Used   Substance and Sexual Activity    Alcohol use: Not on file    Drug use: Not on file    Sexual activity: Not on file   Other Topics Concern    Patient feels they ought to cut down on drinking/drug use No    Patient annoyed by others criticizing their drinking/drug use No    Patient has felt bad or guilty about drinking/drug use No    Patient has had a drink/used drugs as an eye opener in the AM No   Social History Narrative    She is an , single, lives alone, no children       Family History   Problem Relation Age of Onset    Cancer Maternal Grandfather     Celiac disease Sister     Crohn's disease Sister     Autoimmune disease Sister      Review of Systems   Constitutional: Negative for activity change, appetite change, chills, diaphoresis, fatigue, fever and unexpected weight change.   HENT: Negative for congestion, dental problem, drooling, ear discharge, ear pain, facial swelling, hearing loss, mouth sores, nosebleeds, postnasal drip, rhinorrhea, sinus pressure, sneezing, sore throat, tinnitus, trouble  swallowing and voice change.    Respiratory: Negative for cough, choking and shortness of breath.    Cardiovascular: Negative for chest pain, palpitations and leg swelling.   Musculoskeletal: Negative for gait problem.   Allergic/Immunologic: Negative for environmental allergies and immunocompromised state.   Neurological: Positive for numbness and headaches. Negative for dizziness, facial asymmetry, speech difficulty, weakness and light-headedness.   Hematological: Negative for adenopathy. Bruises/bleeds easily.   Psychiatric/Behavioral: The patient is not nervous/anxious.        Objective:      Physical Exam   Constitutional: She is oriented to person, place, and time. She appears well-developed and well-nourished. She is cooperative. She does not have a sickly appearance. She does not appear ill. No distress.   HENT:   Head: Normocephalic and atraumatic.   Right Ear: Hearing and external ear normal.   Left Ear: Hearing and external ear normal.   Nose: Nose normal. No mucosal edema, rhinorrhea or nasal deformity.   Mouth/Throat: Uvula is midline, oropharynx is clear and moist and mucous membranes are normal. Mucous membranes are not pale, not dry and not cyanotic. She does not have dentures. No oral lesions. No trismus in the jaw. Normal dentition. No uvula swelling or lacerations. No oropharyngeal exudate, posterior oropharyngeal edema, posterior oropharyngeal erythema or tonsillar abscesses.              Eyes: Conjunctivae, EOM and lids are normal. Pupils are equal, round, and reactive to light. Right conjunctiva is not injected. Left conjunctiva is not injected.   Neck: Normal range of motion, full passive range of motion without pain and phonation normal. Neck supple. No JVD present. No tracheal deviation present. No thyroid mass and no thyromegaly present.   Salivary glands - there are no lesions or asymmetric findings in the submandibular or parotid glands     Cardiovascular: Normal rate, regular rhythm and  normal pulses.   Pulmonary/Chest: Effort normal. No stridor. No respiratory distress.   Abdominal: She exhibits no distension. There is no guarding.   Musculoskeletal: She exhibits no edema or tenderness.   Lymphadenopathy:        Head (right side): Occipital adenopathy present. No submandibular, no tonsillar, no preauricular and no posterior auricular adenopathy present.        Head (left side): No submental, no submandibular, no tonsillar, no preauricular and no posterior auricular adenopathy present.     She has cervical adenopathy.        Right cervical: No deep cervical and no posterior cervical adenopathy present.       Left cervical: No deep cervical and no posterior cervical adenopathy present.        Right: No supraclavicular adenopathy present.        Left: No supraclavicular adenopathy present.   There is a 2-3mm lymph node overlying her mastoid tip on the right are mobile and nontender.    Neurological: She is alert and oriented to person, place, and time. No cranial nerve deficit. Gait normal.   Skin: No lesion and no rash noted. No cyanosis or erythema. Nails show no clubbing.   Psychiatric: She has a normal mood and affect. Her speech is normal.   Vitals reviewed.          Assessment & Plan:       Problem List Items Addressed This Visit     Squamous cell carcinoma of tongue     Ms. 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 There was no indication for neck dissection. She continues to improve and has no evidence of recurrent disease. Her lymph nodes are stable.

## 2019-08-15 ENCOUNTER — OFFICE VISIT (OUTPATIENT)
Dept: OTOLARYNGOLOGY | Facility: CLINIC | Age: 44
End: 2019-08-15
Payer: COMMERCIAL

## 2019-08-15 VITALS
TEMPERATURE: 98 F | BODY MASS INDEX: 17.57 KG/M2 | HEART RATE: 68 BPM | WEIGHT: 105.63 LBS | SYSTOLIC BLOOD PRESSURE: 123 MMHG | DIASTOLIC BLOOD PRESSURE: 70 MMHG

## 2019-08-15 DIAGNOSIS — C02.9 SQUAMOUS CELL CARCINOMA OF TONGUE: Primary | ICD-10-CM

## 2019-08-15 PROCEDURE — 99999 PR PBB SHADOW E&M-EST. PATIENT-LVL III: ICD-10-PCS | Mod: PBBFAC,,, | Performed by: OTOLARYNGOLOGY

## 2019-08-15 PROCEDURE — 3008F PR BODY MASS INDEX (BMI) DOCUMENTED: ICD-10-PCS | Mod: CPTII,S$GLB,, | Performed by: OTOLARYNGOLOGY

## 2019-08-15 PROCEDURE — 3008F BODY MASS INDEX DOCD: CPT | Mod: CPTII,S$GLB,, | Performed by: OTOLARYNGOLOGY

## 2019-08-15 PROCEDURE — 99213 OFFICE O/P EST LOW 20 MIN: CPT | Mod: S$GLB,,, | Performed by: OTOLARYNGOLOGY

## 2019-08-15 PROCEDURE — 99213 PR OFFICE/OUTPT VISIT, EST, LEVL III, 20-29 MIN: ICD-10-PCS | Mod: S$GLB,,, | Performed by: OTOLARYNGOLOGY

## 2019-08-15 PROCEDURE — 99999 PR PBB SHADOW E&M-EST. PATIENT-LVL III: CPT | Mod: PBBFAC,,, | Performed by: OTOLARYNGOLOGY

## 2019-08-15 NOTE — PROGRESS NOTES
[facility]    Subjective:      Patient ID: Rose Mueller is a 44 y.o. female.    Chief Complaint:  Chief Complaint   Patient presents with    Follow-up     Squamous cell carcinoma of tongue      HPI     TREATMENT HISTORY:  1. Right partial glossectomy, 9/28/2016    Rose [facility] for follow-up after her right partial glossectomy. She has no complaints today. She recently had a steroid injection to her hip/pyirformis, which helped some local pain. She has no issues or changes. She has had some routine dental work performed, and there are no other issues.     There is no dysphagia, odynophagia, sore throat, or otalgia. She has no adenopathy, fever, chills, or weight loss.  She is a non-smoker. She is eating well.     She still does not use tobacco products.     Past Medical History:   Diagnosis Date    Anxiety about health 9/6/2016    previously treated for CIRILO    Headache     Hx of psychiatric care     Migraine headache     Psychiatric problem     Sciatica     Therapy      Past Surgical History:   Procedure Laterality Date    GLOSSECTOMY-PARTIAL Right 9/28/2016    Performed by [facility] at [facility] OR 2ND [facility]LR    PARTIAL GLOSSECTOMY Right 09/28/2016       Current Outpatient Medications:     alprazolam (XANAX XR) 0.5 MG Tb24, , Disp: , Rfl:     ascorbic acid, vitamin C, (VITAMIN C) 500 MG tablet, Take 500 mg by mouth once daily., Disp: , Rfl:     CYANOCOBALAMIN, VITAMIN B-12, (VITAMIN B-12 ORAL), Take by mouth., Disp: , Rfl:     lansoprazole (PREVACID) 30 MG capsule, Take 30 mg by mouth once daily., Disp: , Rfl:     minocycline (MINOCIN,DYNACIN) 100 MG capsule, TK ONE C PO  QD, Disp: , Rfl: 1    multivitamin (ONE DAILY MULTIVITAMIN) per tablet, Take 1 tablet by mouth once daily., Disp: , Rfl:     spironolactone (ALDACTONE) 50 MG tablet, Take 50 mg by mouth once daily., Disp: , Rfl:     sumatriptan (IMITREX) 50 MG tablet, TK ONE T PO  FOR MIGRAINE HEADACHE. MAY REPEAT I 2 HOURS IF NOT RESOLVED. DO NOT TAKE MORE THAN 2 IN 1 DAY OR 3 IN 1 WEEK, Disp: , Rfl: 0    TRINESSA LO 0.18/0.215/0.25 mg-25 mcg tablet, TK 1 T PO QD, Disp: , Rfl: 9    Review of patient's allergies indicates:  No Known Allergies    Social History     Socioeconomic History    Marital status: Single     Spouse name: Not on file    Number of children: 0    Years of education: Not on file    Highest education level: Not on file   Occupational History     Comment: self employed   Social Needs    Financial resource strain: Not on file    Food insecurity:     Worry: Not on file     Inability: Not on file    Transportation needs:     Medical: Not on file     Non-medical: Not on file   Tobacco Use    Smoking status: Never Smoker    Smokeless tobacco: Never Used   Substance and Sexual Activity    Alcohol use: Not on file    Drug use: Not on file    Sexual activity: Not on file   Lifestyle    Physical activity:     Days per week: Not on file     Minutes per session: Not on file    Stress: Not on file   Relationships    Social connections:     Talks on phone: Not on file     Gets together: Not on file     Attends Druze service: Not on file     Active member of club or organization: Not on file     Attends meetings of clubs or organizations: Not on file     Relationship status: Not on file   Other Topics Concern    Patient feels they ought to cut down on drinking/drug use No    Patient annoyed by others criticizing their drinking/drug use No    Patient has felt bad or guilty about drinking/drug use No    Patient has had a drink/used drugs as an eye opener in the AM No   Social History Narrative    She is an , single, lives alone, no children       Family History   Problem Relation Age of Onset    Cancer Maternal Grandfather     Celiac disease Sister     Crohn's disease Sister     Autoimmune disease Sister      Review of Systems   Constitutional:  Negative for activity change, appetite change, chills, diaphoresis, fatigue, fever and unexpected weight change.   HENT: Negative for congestion, dental problem, drooling, ear discharge, ear pain, facial swelling, hearing loss, mouth sores, nosebleeds, postnasal drip, rhinorrhea, sinus pressure, sneezing, sore throat, tinnitus, trouble swallowing and voice change.    Respiratory: Negative for cough, choking and shortness of breath.    Cardiovascular: Negative for chest pain, palpitations and leg swelling.   Musculoskeletal: Negative for gait problem.        Hip injection as above     Allergic/Immunologic: Negative for environmental allergies and immunocompromised state.   Neurological: Positive for numbness and headaches. Negative for dizziness, facial asymmetry, speech difficulty, weakness and light-headedness.   Hematological: Negative for adenopathy. Bruises/bleeds easily.   Psychiatric/Behavioral: The patient is not nervous/anxious.        Objective:      Physical Exam   Constitutional: She is oriented to person, place, and time. She appears well-developed and well-nourished. She is cooperative. She does not have a sickly appearance. She does not appear ill. No distress.   HENT:   Head: Normocephalic and atraumatic.   Right Ear: Hearing and external ear normal.   Left Ear: Hearing and external ear normal.   Nose: Nose normal. No mucosal edema, rhinorrhea or nasal deformity.   Mouth/Throat: Uvula is midline, oropharynx is clear and moist and mucous membranes are normal. Mucous membranes are not pale, not dry and not cyanotic. She does not have dentures. No oral lesions. No trismus in the jaw. Normal dentition. No uvula swelling or lacerations. No oropharyngeal exudate, posterior oropharyngeal edema, posterior oropharyngeal erythema or tonsillar abscesses.              Eyes: Pupils are equal, round, and reactive to light. Conjunctivae, EOM and lids are normal. Right conjunctiva is not injected. Left conjunctiva is  not injected.   Neck: Normal range of motion, full passive range of motion without pain and phonation normal. Neck supple. No JVD present. No tracheal deviation present. No thyroid mass and no thyromegaly present.   Salivary glands - there are no lesions or asymmetric findings in the submandibular or parotid glands     Cardiovascular: Normal rate, regular rhythm and normal pulses.   Pulmonary/Chest: Effort normal. No stridor. No respiratory distress.   Abdominal: She exhibits no distension. There is no guarding.   Musculoskeletal: She exhibits no edema or tenderness.   Lymphadenopathy:        Head (right side): No submandibular, no tonsillar, no preauricular, no posterior auricular and no occipital adenopathy present.        Head (left side): No submental, no submandibular, no tonsillar, no preauricular and no posterior auricular adenopathy present.     She has no cervical adenopathy.        Right cervical: No deep cervical and no posterior cervical adenopathy present.       Left cervical: No deep cervical and no posterior cervical adenopathy present.        Right: No supraclavicular adenopathy present.        Left: No supraclavicular adenopathy present.   1mm node over left mastoid    Neurological: She is alert and oriented to person, place, and time. No cranial nerve deficit. Gait normal.   Skin: No lesion and no rash noted. No cyanosis or erythema. Nails show no clubbing.   Psychiatric: She has a normal mood and affect. Her speech is normal.   Vitals reviewed.        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 There was no indication for neck dissection. She continues to improve and has no evidence of recurrent disease. She will follow-up in 6-12 months, sooner as needed.

## 2019-08-15 NOTE — ASSESSMENT & PLAN NOTE
Ms. 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 There was no indication for neck dissection. She continues to improve and has no evidence of recurrent disease. She will follow-up in 6-12 months, sooner as needed.

## 2021-03-04 ENCOUNTER — OFFICE VISIT (OUTPATIENT)
Dept: OTOLARYNGOLOGY | Facility: CLINIC | Age: 46
End: 2021-03-04
Payer: COMMERCIAL

## 2021-03-04 VITALS
HEART RATE: 79 BPM | SYSTOLIC BLOOD PRESSURE: 114 MMHG | DIASTOLIC BLOOD PRESSURE: 70 MMHG | BODY MASS INDEX: 18.27 KG/M2 | WEIGHT: 109.81 LBS

## 2021-03-04 DIAGNOSIS — C02.9 SQUAMOUS CELL CARCINOMA OF TONGUE: ICD-10-CM

## 2021-03-04 PROCEDURE — 3008F BODY MASS INDEX DOCD: CPT | Mod: CPTII,S$GLB,, | Performed by: OTOLARYNGOLOGY

## 2021-03-04 PROCEDURE — 99999 PR PBB SHADOW E&M-EST. PATIENT-LVL IV: ICD-10-PCS | Mod: PBBFAC,,, | Performed by: OTOLARYNGOLOGY

## 2021-03-04 PROCEDURE — 1126F PR PAIN SEVERITY QUANTIFIED, NO PAIN PRESENT: ICD-10-PCS | Mod: S$GLB,,, | Performed by: OTOLARYNGOLOGY

## 2021-03-04 PROCEDURE — 1126F AMNT PAIN NOTED NONE PRSNT: CPT | Mod: S$GLB,,, | Performed by: OTOLARYNGOLOGY

## 2021-03-04 PROCEDURE — 3008F PR BODY MASS INDEX (BMI) DOCUMENTED: ICD-10-PCS | Mod: CPTII,S$GLB,, | Performed by: OTOLARYNGOLOGY

## 2021-03-04 PROCEDURE — 99213 PR OFFICE/OUTPT VISIT, EST, LEVL III, 20-29 MIN: ICD-10-PCS | Mod: S$GLB,,, | Performed by: OTOLARYNGOLOGY

## 2021-03-04 PROCEDURE — 99213 OFFICE O/P EST LOW 20 MIN: CPT | Mod: S$GLB,,, | Performed by: OTOLARYNGOLOGY

## 2021-03-04 PROCEDURE — 99999 PR PBB SHADOW E&M-EST. PATIENT-LVL IV: CPT | Mod: PBBFAC,,, | Performed by: OTOLARYNGOLOGY

## 2021-03-04 RX ORDER — CLINDAMYCIN PHOSPHATE 10 MG/ML
1 SOLUTION TOPICAL 2 TIMES DAILY
COMMUNITY
Start: 2021-02-04 | End: 2023-04-21

## 2021-03-04 RX ORDER — AMLODIPINE BESYLATE 5 MG/1
5 TABLET ORAL DAILY
COMMUNITY
Start: 2021-02-08 | End: 2022-03-18

## 2021-03-04 RX ORDER — HYDROXYCHLOROQUINE SULFATE 200 MG/1
200 TABLET, FILM COATED ORAL DAILY
COMMUNITY
Start: 2021-02-08 | End: 2022-03-18

## 2021-03-04 RX ORDER — NORETHINDRONE ACETATE AND ETHINYL ESTRADIOL, ETHINYL ESTRADIOL AND FERROUS FUMARATE 1MG-10(24)
1 KIT ORAL DAILY
COMMUNITY
Start: 2021-02-12 | End: 2021-10-22

## 2021-03-04 RX ORDER — TRETINOIN 0.1 MG/G
GEL TOPICAL
COMMUNITY
Start: 2021-02-04

## 2021-03-04 RX ORDER — DAPSONE 75 MG/G
GEL TOPICAL
COMMUNITY
Start: 2021-02-04 | End: 2023-04-21

## 2021-03-04 RX ORDER — LEVOTHYROXINE SODIUM 25 UG/1
25 TABLET ORAL DAILY
COMMUNITY
Start: 2021-02-17

## 2021-03-04 RX ORDER — IVERMECTIN 10 MG/G
CREAM TOPICAL
COMMUNITY
Start: 2021-02-05 | End: 2022-03-18

## 2022-03-18 ENCOUNTER — OFFICE VISIT (OUTPATIENT)
Dept: OTOLARYNGOLOGY | Facility: CLINIC | Age: 47
End: 2022-03-18
Payer: COMMERCIAL

## 2022-03-18 VITALS
HEART RATE: 75 BPM | WEIGHT: 107.38 LBS | BODY MASS INDEX: 17.87 KG/M2 | TEMPERATURE: 98 F | DIASTOLIC BLOOD PRESSURE: 75 MMHG | SYSTOLIC BLOOD PRESSURE: 116 MMHG

## 2022-03-18 DIAGNOSIS — C02.9 SQUAMOUS CELL CARCINOMA OF TONGUE: Primary | ICD-10-CM

## 2022-03-18 PROCEDURE — 3074F PR MOST RECENT SYSTOLIC BLOOD PRESSURE < 130 MM HG: ICD-10-PCS | Mod: CPTII,S$GLB,, | Performed by: OTOLARYNGOLOGY

## 2022-03-18 PROCEDURE — 3078F PR MOST RECENT DIASTOLIC BLOOD PRESSURE < 80 MM HG: ICD-10-PCS | Mod: CPTII,S$GLB,, | Performed by: OTOLARYNGOLOGY

## 2022-03-18 PROCEDURE — 3078F DIAST BP <80 MM HG: CPT | Mod: CPTII,S$GLB,, | Performed by: OTOLARYNGOLOGY

## 2022-03-18 PROCEDURE — 3008F PR BODY MASS INDEX (BMI) DOCUMENTED: ICD-10-PCS | Mod: CPTII,S$GLB,, | Performed by: OTOLARYNGOLOGY

## 2022-03-18 PROCEDURE — 1159F PR MEDICATION LIST DOCUMENTED IN MEDICAL RECORD: ICD-10-PCS | Mod: CPTII,S$GLB,, | Performed by: OTOLARYNGOLOGY

## 2022-03-18 PROCEDURE — 1160F RVW MEDS BY RX/DR IN RCRD: CPT | Mod: CPTII,S$GLB,, | Performed by: OTOLARYNGOLOGY

## 2022-03-18 PROCEDURE — 1159F MED LIST DOCD IN RCRD: CPT | Mod: CPTII,S$GLB,, | Performed by: OTOLARYNGOLOGY

## 2022-03-18 PROCEDURE — 3008F BODY MASS INDEX DOCD: CPT | Mod: CPTII,S$GLB,, | Performed by: OTOLARYNGOLOGY

## 2022-03-18 PROCEDURE — 3074F SYST BP LT 130 MM HG: CPT | Mod: CPTII,S$GLB,, | Performed by: OTOLARYNGOLOGY

## 2022-03-18 PROCEDURE — 99999 PR PBB SHADOW E&M-EST. PATIENT-LVL III: CPT | Mod: PBBFAC,,, | Performed by: OTOLARYNGOLOGY

## 2022-03-18 PROCEDURE — 1160F PR REVIEW ALL MEDS BY PRESCRIBER/CLIN PHARMACIST DOCUMENTED: ICD-10-PCS | Mod: CPTII,S$GLB,, | Performed by: OTOLARYNGOLOGY

## 2022-03-18 PROCEDURE — 99213 PR OFFICE/OUTPT VISIT, EST, LEVL III, 20-29 MIN: ICD-10-PCS | Mod: S$GLB,,, | Performed by: OTOLARYNGOLOGY

## 2022-03-18 PROCEDURE — 99213 OFFICE O/P EST LOW 20 MIN: CPT | Mod: S$GLB,,, | Performed by: OTOLARYNGOLOGY

## 2022-03-18 PROCEDURE — 99999 PR PBB SHADOW E&M-EST. PATIENT-LVL III: ICD-10-PCS | Mod: PBBFAC,,, | Performed by: OTOLARYNGOLOGY

## 2022-03-18 NOTE — ASSESSMENT & PLAN NOTE
Ms. 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 There was no indication for neck dissection. She continues to do great and has no evidence of recurrent disease. She will follow-up in 12 months, sooner as needed.

## 2022-03-18 NOTE — PROGRESS NOTES
[facility]    Subjective:      Patient ID: Rose Mueller is a 46 y.o. female.    Chief Complaint:  Chief Complaint   Patient presents with    Follow-up      Follow-up  Associated symptoms include headaches, neck pain and numbness. Pertinent negatives include no chest pain, chills, congestion, coughing, diaphoresis, fatigue, fever, sore throat or weakness.      TREATMENT HISTORY:  1. Right partial glossectomy, 9/28/2016    [facility] after her right partial glossectomy. She has no complaints today. She has no issues or changes with her mouth or tongue. She still checks with Dr. Howard every 6 months. There is no dysphagia, odynophagia, sore throat, or otalgia. She has no adenopathy, fever, chills, or weight loss.  She is a non-smoker. She is eating well.     She still does not use tobacco products. She is not thinking of starting.    Of note, she reports that she has been diagnosed with an autoimmune condition and is under the care of Dr. Hilton at [facility]. She is on synthroid to try to increase her core temperature - similarly she was on hydroxychloroquine and BP meds but has stopped them.     Past Medical History:   Diagnosis Date    Acne Sprionolactone    Anxiety about health 9/6/2016    previously treated for CIRILO    Cancer Squamous Cell Carcinoma    Surgically removed on tongue    GERD (gastroesophageal reflux disease) Prevacid    Headache     Hx of psychiatric care     Hypothyroidism Synthroid to help with Pernio    Normally would not require treatment    Migraine headache     Psychiatric problem     Sciatica     Therapy      Past Surgical History:   Procedure Laterality Date    PARTIAL GLOSSECTOMY Right 09/28/2016       Current Outpatient Medications:     ACZONE 7.5 % GlwP, , Disp: , Rfl:     alprazolam (XANAX XR) 0.5 MG Tb24, , Disp: , Rfl:     clindamycin (CLEOCIN T) 1 % Swab, Apply 1 each topically 2 (two)  times daily., Disp: , Rfl:     lansoprazole (PREVACID) 30 MG capsule, Take 30 mg by mouth once daily., Disp: , Rfl:     levothyroxine (SYNTHROID) 25 MCG tablet, Take 25 mcg by mouth once daily., Disp: , Rfl:     LO LOESTRIN FE 1 mg-10 mcg (24)/10 mcg (2) Tab, TAKE 1 TABLET BY MOUTH EVERY DAY, Disp: 28 tablet, Rfl: 3    multivitamin (THERAGRAN) per tablet, Take 1 tablet by mouth once daily., Disp: , Rfl:     spironolactone (ALDACTONE) 50 MG tablet, Take 50 mg by mouth once daily., Disp: , Rfl:     sumatriptan (IMITREX) 50 MG tablet, TK ONE T PO FOR MIGRAINE HEADACHE. MAY REPEAT I 2 HOURS IF NOT RESOLVED. DO NOT TAKE MORE THAN 2 IN 1 DAY OR 3 IN 1 WEEK, Disp: , Rfl: 0    tretinoin (RETIN-A) 0.01 % gel, , Disp: , Rfl:     amLODIPine (NORVASC) 5 MG tablet, Take 5 mg by mouth once daily., Disp: , Rfl:     hydrOXYchloroQUINE (PLAQUENIL) 200 mg tablet, Take 200 mg by mouth once daily., Disp: , Rfl:     SOOLANTRA 1 % Crea, , Disp: , Rfl:     TRINESSA LO 0.18/0.215/0.25 mg-25 mcg tablet, TK 1 T PO QD, Disp: , Rfl: 9    Review of patient's allergies indicates:  No Known Allergies    Social History     Socioeconomic History    Marital status: Single    Number of children: 0   Occupational History     Comment: self employed   Tobacco Use    Smoking status: Never Smoker    Smokeless tobacco: Never Used   Substance and Sexual Activity    Alcohol use: Yes     Alcohol/week: 7.0 standard drinks     Types: 7 Glasses of wine per week     Comment: Glass of red wine per night    Drug use: Never    Sexual activity: Yes     Partners: Male     Comment: Lo Loestrin   Other Topics Concern    Patient feels they ought to cut down on drinking/drug use No    Patient annoyed by others criticizing their drinking/drug use No    Patient has felt bad or guilty about drinking/drug use No    Patient has had a drink/used drugs as an eye opener in the AM No   Social History Narrative    She is an , single, lives  alone, no children       Family History   Problem Relation Age of Onset    Cancer Maternal Grandfather     Celiac disease Sister     Crohn's disease Sister     Autoimmune disease Sister      Review of Systems   Constitutional: Negative.  Negative for activity change, appetite change, chills, diaphoresis, fatigue, fever and unexpected weight change.   HENT: Negative.  Negative for congestion, dental problem, drooling, ear discharge, ear pain, facial swelling, hearing loss, mouth sores, nosebleeds, postnasal drip, rhinorrhea, sinus pressure, sneezing, sore throat, tinnitus, trouble swallowing and voice change.    Eyes: Negative.    Respiratory: Negative.  Negative for cough, choking and shortness of breath.    Cardiovascular: Negative.  Negative for chest pain, palpitations and leg swelling.   Gastrointestinal: Negative.    Endocrine: Negative.    Genitourinary: Negative.    Musculoskeletal: Positive for neck pain. Negative for gait problem.        Hip injection as above     Skin: Positive for color change and pallor.        Chillblains of hands   Allergic/Immunologic: Negative.  Negative for environmental allergies and immunocompromised state.   Neurological: Positive for numbness and headaches. Negative for dizziness, facial asymmetry, speech difficulty, weakness and light-headedness.   Hematological: Negative for adenopathy. Bruises/bleeds easily.   Psychiatric/Behavioral: Negative.  The patient is not nervous/anxious.        Objective:      Physical Exam  Vitals reviewed.   Constitutional:       General: She is not in acute distress.     Appearance: She is well-developed. She is not ill-appearing.   HENT:      Head: Normocephalic and atraumatic.      Jaw: No trismus.      Right Ear: Hearing and external ear normal.      Left Ear: Hearing and external ear normal.      Nose: Nose normal. No nasal deformity, mucosal edema or rhinorrhea.      Mouth/Throat:      Mouth: Mucous membranes are not pale, not dry and not  cyanotic. No lacerations or oral lesions.      Dentition: Normal dentition. Does not have dentures.      Pharynx: Uvula midline. No oropharyngeal exudate, posterior oropharyngeal erythema or uvula swelling.      Tonsils: No tonsillar abscesses.     Eyes:      General: Lids are normal.      Conjunctiva/sclera: Conjunctivae normal.      Right eye: Right conjunctiva is not injected.      Left eye: Left conjunctiva is not injected.      Pupils: Pupils are equal, round, and reactive to light.   Neck:      Thyroid: No thyroid mass or thyromegaly.      Vascular: No JVD.      Trachea: Phonation normal. No tracheal deviation.      Comments: Salivary glands - there are no lesions or asymmetric findings in the submandibular or parotid glands    Pulmonary:      Effort: Pulmonary effort is normal. No respiratory distress.      Breath sounds: No stridor.   Chest:   Breasts:      Right: No supraclavicular adenopathy.      Left: No supraclavicular adenopathy.       Musculoskeletal:         General: No tenderness.      Cervical back: Full passive range of motion without pain, normal range of motion and neck supple.   Lymphadenopathy:      Head:      Right side of head: No submandibular, tonsillar, preauricular, posterior auricular or occipital adenopathy.      Left side of head: No submental, submandibular, tonsillar, preauricular or posterior auricular adenopathy.      Cervical: No cervical adenopathy.      Right cervical: No deep or posterior cervical adenopathy.     Left cervical: No deep or posterior cervical adenopathy.      Upper Body:      Right upper body: No supraclavicular adenopathy.      Left upper body: No supraclavicular adenopathy.      Comments:      Skin:     Findings: No erythema, lesion or rash.      Nails: There is no clubbing.   Neurological:      Mental Status: She is alert and oriented to person, place, and time.      Cranial Nerves: No cranial nerve deficit.      Gait: Gait normal.   Psychiatric:          Speech: Speech normal.         Behavior: Behavior is cooperative.         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 There was no indication for neck dissection. She continues to do great and has no evidence of recurrent disease. She will follow-up in 12 months, sooner as needed.

## 2022-05-07 NOTE — PATIENT INSTRUCTIONS
"Oral exercises and stretches:  Do 2-3x per day, 10 reps each:  *  Yawning - to stretch the back of the throat and help the soft palate's range of motion  *  Scar stretching:  Use a clean or gloved finger and stretch the scar beneath your tongue and the R side of your your tongue.  Try to  what is under the pad of your fingertip and stretch toward your tongue tip.    *  Try to align your tongue tip with the center of your upper or lower teeth and hold 5 seconds.  *  Find the center spot and stick your tongue out as far as possible; hold for 5-6 seconds.  Use the stack of tongue depressors to keep your jaw out of play.  *  Move your tongue tip along the inside of your lower teeth as far back as you can hold and hold for 5 seconds. Do in each direction.  Repeat inside top arch and outside top and bottom arches.  *  Move your tongue toward the Left corner of your mouth and hold (at farthest point) for 5 seconds.  *  Curl your back as far as possible and hold 5 seconds.  *  Raise your tongue tip to the spot right behind your top teeth (help with your jaw as needed right now) and hold the spot where you feel a stretch (the farthest away you can hold your jaw and still touch the tongue tip).  * Do an "effortful swallow" -  Like you are trying to get a glob of peanut butter or big pill down.  This asks all the parts involved in swallowing to move as far as possible, and will include your tongue.  Do 10.     Speech:  Manage saliva as soon as you notice it to help eliminate "lip control."    Try some tongue twisters with /s/ and "sh" in them (Yvonne sells seashells...).  Use round lips to help channel air centrally for "sh" and "zh."    I saw Sarah Beth sitting in a [employer] shop.  So, this is the .      Watch -- as you can tolerate -- in the mirror to monitor for excess tension around the lips and relax out of it.    Wednesday the fourth or Thursday the fifth.    "
Patient with one or more new problems requiring additional work-up/treatment.

## 2022-07-06 DIAGNOSIS — Z12.11 COLON CANCER SCREENING: Primary | ICD-10-CM

## 2022-08-12 ENCOUNTER — PATIENT MESSAGE (OUTPATIENT)
Dept: ENDOSCOPY | Facility: HOSPITAL | Age: 47
End: 2022-08-12
Payer: COMMERCIAL

## 2022-08-29 ENCOUNTER — PATIENT MESSAGE (OUTPATIENT)
Dept: CARDIOLOGY | Facility: CLINIC | Age: 47
End: 2022-08-29
Payer: COMMERCIAL

## 2022-09-01 DIAGNOSIS — Z12.11 SCREENING FOR COLON CANCER: Primary | ICD-10-CM

## 2023-04-21 ENCOUNTER — OFFICE VISIT (OUTPATIENT)
Dept: OTOLARYNGOLOGY | Facility: CLINIC | Age: 48
End: 2023-04-21
Payer: COMMERCIAL

## 2023-04-21 VITALS
HEIGHT: 65 IN | HEART RATE: 68 BPM | DIASTOLIC BLOOD PRESSURE: 70 MMHG | SYSTOLIC BLOOD PRESSURE: 115 MMHG | BODY MASS INDEX: 18.26 KG/M2 | WEIGHT: 109.56 LBS

## 2023-04-21 DIAGNOSIS — C02.9 SQUAMOUS CELL CARCINOMA OF TONGUE: ICD-10-CM

## 2023-04-21 PROCEDURE — 3078F DIAST BP <80 MM HG: CPT | Mod: CPTII,S$GLB,, | Performed by: OTOLARYNGOLOGY

## 2023-04-21 PROCEDURE — 1159F MED LIST DOCD IN RCRD: CPT | Mod: CPTII,S$GLB,, | Performed by: OTOLARYNGOLOGY

## 2023-04-21 PROCEDURE — 99999 PR PBB SHADOW E&M-EST. PATIENT-LVL III: ICD-10-PCS | Mod: PBBFAC,,, | Performed by: OTOLARYNGOLOGY

## 2023-04-21 PROCEDURE — 3078F PR MOST RECENT DIASTOLIC BLOOD PRESSURE < 80 MM HG: ICD-10-PCS | Mod: CPTII,S$GLB,, | Performed by: OTOLARYNGOLOGY

## 2023-04-21 PROCEDURE — 99214 OFFICE O/P EST MOD 30 MIN: CPT | Mod: S$GLB,,, | Performed by: OTOLARYNGOLOGY

## 2023-04-21 PROCEDURE — 3074F SYST BP LT 130 MM HG: CPT | Mod: CPTII,S$GLB,, | Performed by: OTOLARYNGOLOGY

## 2023-04-21 PROCEDURE — 3074F PR MOST RECENT SYSTOLIC BLOOD PRESSURE < 130 MM HG: ICD-10-PCS | Mod: CPTII,S$GLB,, | Performed by: OTOLARYNGOLOGY

## 2023-04-21 PROCEDURE — 3008F BODY MASS INDEX DOCD: CPT | Mod: CPTII,S$GLB,, | Performed by: OTOLARYNGOLOGY

## 2023-04-21 PROCEDURE — 99999 PR PBB SHADOW E&M-EST. PATIENT-LVL III: CPT | Mod: PBBFAC,,, | Performed by: OTOLARYNGOLOGY

## 2023-04-21 PROCEDURE — 3008F PR BODY MASS INDEX (BMI) DOCUMENTED: ICD-10-PCS | Mod: CPTII,S$GLB,, | Performed by: OTOLARYNGOLOGY

## 2023-04-21 PROCEDURE — 99214 PR OFFICE/OUTPT VISIT, EST, LEVL IV, 30-39 MIN: ICD-10-PCS | Mod: S$GLB,,, | Performed by: OTOLARYNGOLOGY

## 2023-04-21 PROCEDURE — 1160F PR REVIEW ALL MEDS BY PRESCRIBER/CLIN PHARMACIST DOCUMENTED: ICD-10-PCS | Mod: CPTII,S$GLB,, | Performed by: OTOLARYNGOLOGY

## 2023-04-21 PROCEDURE — 1159F PR MEDICATION LIST DOCUMENTED IN MEDICAL RECORD: ICD-10-PCS | Mod: CPTII,S$GLB,, | Performed by: OTOLARYNGOLOGY

## 2023-04-21 PROCEDURE — 1160F RVW MEDS BY RX/DR IN RCRD: CPT | Mod: CPTII,S$GLB,, | Performed by: OTOLARYNGOLOGY

## 2023-04-21 RX ORDER — ASCORBIC ACID 500 MG
500 TABLET ORAL
COMMUNITY

## 2023-04-21 NOTE — ASSESSMENT & PLAN NOTE
She appears to have a post-traumatic ulceration with healing eschar of the left lateral tongue. She cannot discretely remember biting her tongue, but notes that that happens a lot. I am not concerned with the appearance and the duration of symptoms, but, given her history, I would like to see her back in 3 weeks to verify its resolution or take appropriate steps to further evaluate it if it remains.

## 2023-04-21 NOTE — PROGRESS NOTES
[facility]    Subjective:      Patient ID: Rose Mueller is a 47 y.o. female.    Chief Complaint:  No chief complaint on file.     Follow-up  Associated symptoms include headaches, neck pain and numbness. Pertinent negatives include no chest pain, chills, congestion, coughing, diaphoresis, fatigue, fever, sore throat or weakness.    TREATMENT HISTORY:  1. Right partial glossectomy, 9/28/2016    [facility] after her right partial glossectomy. She presents today with a 3-4 day history of tongue sensitivity and what feels like an ulcer on the left lateral side of her tongue, opposite of the side of her cancer. It really bothered her on Tuesday, so she made the appointment Wednesday. She tried yesterday to not talk, since she thought her teeth were irritating it. She has been trying to look at the area with her phone, but cannot really see it well - seeing both bumps on the top and perhaps an ulcer on the ventral aspect. It bothers her more when she is talking. Outside of this, she has been doing fine. She still checks with Dr. Howard every 6 months. There is no dysphagia, odynophagia, sore throat, or otalgia. She has no adenopathy, fever, chills, or weight loss.  She is a non-smoker. She is eating well.     She still does not use tobacco products. She is not thinking of starting.    Of note, she reports that she has done well with her rheumatologic disorder, but has a chillblane right now on her right 4th DIP.     Past Medical History:   Diagnosis Date    Acne Sprionolactone    Anxiety about health 9/6/2016    previously treated for CIRILO    Cancer Squamous Cell Carcinoma    Surgically removed on tongue    GERD (gastroesophageal reflux disease) Prevacid    Headache     Hx of psychiatric care     Hypothyroidism Synthroid to help with Pernio    Normally would not require treatment    Migraine headache     Psychiatric problem     Sciatica      Therapy      Past Surgical History:   Procedure Laterality Date    PARTIAL GLOSSECTOMY Right 09/28/2016       Current Outpatient Medications:     alprazolam (XANAX XR) 0.5 MG Tb24, , Disp: , Rfl:     lansoprazole (PREVACID) 30 MG capsule, Take 30 mg by mouth once daily., Disp: , Rfl:     levothyroxine (SYNTHROID) 25 MCG tablet, Take 25 mcg by mouth once daily., Disp: , Rfl:     multivitamin (THERAGRAN) per tablet, Take 1 tablet by mouth once daily., Disp: , Rfl:     norethindrone-e.estradioL-iron (LO LOESTRIN FE) 1 mg-10 mcg (24)/10 mcg (2) Tab, Take 1 tablet by mouth once daily., Disp: 28 tablet, Rfl: 11    spironolactone (ALDACTONE) 50 MG tablet, Take 50 mg by mouth once daily., Disp: , Rfl:     sumatriptan (IMITREX) 50 MG tablet, TK ONE T PO FOR MIGRAINE HEADACHE. MAY REPEAT I 2 HOURS IF NOT RESOLVED. DO NOT TAKE MORE THAN 2 IN 1 DAY OR 3 IN 1 WEEK, Disp: , Rfl: 0    tretinoin (RETIN-A) 0.01 % gel, , Disp: , Rfl:     ACZONE 7.5 % GlwP, , Disp: , Rfl:     ascorbic acid, vitamin C, (VITAMIN C) 500 MG tablet, Take 500 mg by mouth., Disp: , Rfl:     clindamycin (CLEOCIN T) 1 % Swab, Apply 1 each topically 2 (two) times daily., Disp: , Rfl:     CYANOCOBALAMIN, VITAMIN B-12, ORAL, Take by mouth., Disp: , Rfl:     Review of patient's allergies indicates:  No Known Allergies    Social History     Socioeconomic History    Marital status: Single    Number of children: 0   Occupational History     Comment: self employed   Tobacco Use    Smoking status: Never    Smokeless tobacco: Never   Substance and Sexual Activity    Alcohol use: Yes     Alcohol/week: 5.0 standard drinks     Types: 5 Glasses of wine per week     Comment: Glass of red wine per night    Drug use: Never    Sexual activity: Yes     Partners: Male     Comment: Lo Loestrin Fe   Other Topics Concern    Patient feels they ought to cut down on drinking/drug use No    Patient annoyed by others criticizing their drinking/drug use No    Patient has felt bad or  guilty about drinking/drug use No    Patient has had a drink/used drugs as an eye opener in the AM No   Social History Narrative    She is an , single, lives alone, no children       Family History   Problem Relation Age of Onset    Cancer Maternal Grandfather     Celiac disease Sister     Crohn's disease Sister     Autoimmune disease Sister     Migraines Sister     Thyroid disease Sister     Thyroid disease Mother     Migraines Father      Review of Systems   Constitutional: Negative.  Negative for activity change, appetite change, chills, diaphoresis, fatigue, fever and unexpected weight change.   HENT: Negative.  Negative for congestion, dental problem, drooling, ear discharge, ear pain, facial swelling, hearing loss, mouth sores, nosebleeds, postnasal drip, rhinorrhea, sinus pressure, sneezing, sore throat, tinnitus, trouble swallowing and voice change.    Eyes: Negative.    Respiratory: Negative.  Negative for cough, choking and shortness of breath.    Cardiovascular: Negative.  Negative for chest pain, palpitations and leg swelling.   Gastrointestinal: Negative.    Endocrine: Negative.    Genitourinary: Negative.    Musculoskeletal:  Positive for neck pain. Negative for gait problem.        Hip injection as above     Skin:  Positive for color change and pallor.        Chillblains of hands   Allergic/Immunologic: Negative.  Negative for environmental allergies and immunocompromised state.   Neurological:  Positive for numbness and headaches. Negative for dizziness, facial asymmetry, speech difficulty, weakness and light-headedness.   Hematological:  Negative for adenopathy. Bruises/bleeds easily.   Psychiatric/Behavioral: Negative.  The patient is not nervous/anxious.      Objective:      Physical Exam  Vitals reviewed.   Constitutional:       General: She is not in acute distress.     Appearance: She is well-developed. She is not ill-appearing.   HENT:      Head: Normocephalic and atraumatic.       Jaw: No trismus.      Right Ear: Hearing and external ear normal.      Left Ear: Hearing and external ear normal.      Nose: Nose normal. No nasal deformity, mucosal edema or rhinorrhea.      Mouth/Throat:      Mouth: Mucous membranes are not pale, not dry and not cyanotic. No lacerations or oral lesions.      Dentition: Normal dentition. Does not have dentures.      Pharynx: Uvula midline. No oropharyngeal exudate, posterior oropharyngeal erythema or uvula swelling.      Tonsils: No tonsillar abscesses.     Eyes:      General: Lids are normal.      Conjunctiva/sclera: Conjunctivae normal.      Right eye: Right conjunctiva is not injected.      Left eye: Left conjunctiva is not injected.      Pupils: Pupils are equal, round, and reactive to light.   Neck:      Thyroid: No thyroid mass or thyromegaly.      Vascular: No JVD.      Trachea: Phonation normal. No tracheal deviation.      Comments: Salivary glands - there are no lesions or asymmetric findings in the submandibular or parotid glands    Pulmonary:      Effort: Pulmonary effort is normal. No respiratory distress.      Breath sounds: No stridor.   Musculoskeletal:         General: No tenderness.      Cervical back: Full passive range of motion without pain, normal range of motion and neck supple.   Lymphadenopathy:      Head:      Right side of head: No submandibular, tonsillar, preauricular, posterior auricular or occipital adenopathy.      Left side of head: No submental, submandibular, tonsillar, preauricular or posterior auricular adenopathy.      Cervical: No cervical adenopathy.      Right cervical: No deep or posterior cervical adenopathy.     Left cervical: No deep or posterior cervical adenopathy.      Upper Body:      Right upper body: No supraclavicular adenopathy.      Left upper body: No supraclavicular adenopathy.      Comments:      Skin:     Findings: No erythema, lesion or rash.      Nails: There is no clubbing.   Neurological:      Mental  Status: She is alert and oriented to person, place, and time.      Cranial Nerves: No cranial nerve deficit.      Gait: Gait normal.   Psychiatric:         Speech: Speech normal.         Behavior: Behavior is cooperative.           Assessment & Plan:       Problem List Items Addressed This Visit       Squamous cell carcinoma of tongue     She appears to have a post-traumatic ulceration with healing eschar of the left lateral tongue. She cannot discretely remember biting her tongue, but notes that that happens a lot. I am not concerned with the appearance and the duration of symptoms, but, given her history, I would like to see her back in 3 weeks to verify its resolution or take appropriate steps to further evaluate it if it remains.

## 2023-05-12 ENCOUNTER — OFFICE VISIT (OUTPATIENT)
Dept: OTOLARYNGOLOGY | Facility: CLINIC | Age: 48
End: 2023-05-12
Payer: COMMERCIAL

## 2023-05-12 VITALS
WEIGHT: 110.69 LBS | BODY MASS INDEX: 18.42 KG/M2 | DIASTOLIC BLOOD PRESSURE: 50 MMHG | HEART RATE: 74 BPM | SYSTOLIC BLOOD PRESSURE: 108 MMHG

## 2023-05-12 DIAGNOSIS — C02.9 SQUAMOUS CELL CARCINOMA OF TONGUE: ICD-10-CM

## 2023-05-12 PROCEDURE — 3078F DIAST BP <80 MM HG: CPT | Mod: CPTII,S$GLB,, | Performed by: OTOLARYNGOLOGY

## 2023-05-12 PROCEDURE — 3074F SYST BP LT 130 MM HG: CPT | Mod: CPTII,S$GLB,, | Performed by: OTOLARYNGOLOGY

## 2023-05-12 PROCEDURE — 3008F BODY MASS INDEX DOCD: CPT | Mod: CPTII,S$GLB,, | Performed by: OTOLARYNGOLOGY

## 2023-05-12 PROCEDURE — 99213 PR OFFICE/OUTPT VISIT, EST, LEVL III, 20-29 MIN: ICD-10-PCS | Mod: S$GLB,,, | Performed by: OTOLARYNGOLOGY

## 2023-05-12 PROCEDURE — 3074F PR MOST RECENT SYSTOLIC BLOOD PRESSURE < 130 MM HG: ICD-10-PCS | Mod: CPTII,S$GLB,, | Performed by: OTOLARYNGOLOGY

## 2023-05-12 PROCEDURE — 1159F MED LIST DOCD IN RCRD: CPT | Mod: CPTII,S$GLB,, | Performed by: OTOLARYNGOLOGY

## 2023-05-12 PROCEDURE — 1159F PR MEDICATION LIST DOCUMENTED IN MEDICAL RECORD: ICD-10-PCS | Mod: CPTII,S$GLB,, | Performed by: OTOLARYNGOLOGY

## 2023-05-12 PROCEDURE — 99999 PR PBB SHADOW E&M-EST. PATIENT-LVL III: ICD-10-PCS | Mod: PBBFAC,,, | Performed by: OTOLARYNGOLOGY

## 2023-05-12 PROCEDURE — 99999 PR PBB SHADOW E&M-EST. PATIENT-LVL III: CPT | Mod: PBBFAC,,, | Performed by: OTOLARYNGOLOGY

## 2023-05-12 PROCEDURE — 99213 OFFICE O/P EST LOW 20 MIN: CPT | Mod: S$GLB,,, | Performed by: OTOLARYNGOLOGY

## 2023-05-12 PROCEDURE — 3008F PR BODY MASS INDEX (BMI) DOCUMENTED: ICD-10-PCS | Mod: CPTII,S$GLB,, | Performed by: OTOLARYNGOLOGY

## 2023-05-12 PROCEDURE — 3078F PR MOST RECENT DIASTOLIC BLOOD PRESSURE < 80 MM HG: ICD-10-PCS | Mod: CPTII,S$GLB,, | Performed by: OTOLARYNGOLOGY

## 2023-05-12 PROCEDURE — 1160F PR REVIEW ALL MEDS BY PRESCRIBER/CLIN PHARMACIST DOCUMENTED: ICD-10-PCS | Mod: CPTII,S$GLB,, | Performed by: OTOLARYNGOLOGY

## 2023-05-12 PROCEDURE — 1160F RVW MEDS BY RX/DR IN RCRD: CPT | Mod: CPTII,S$GLB,, | Performed by: OTOLARYNGOLOGY

## 2023-05-12 NOTE — PROGRESS NOTES
[facility]    Subjective:      Patient ID: Rose Mueller is a 47 y.o. female.    Chief Complaint:  Chief Complaint   Patient presents with    3wk f/u      Follow-up  Associated symptoms include headaches, neck pain and numbness. Pertinent negatives include no chest pain, chills, congestion, coughing, diaphoresis, fatigue, fever, sore throat or weakness.      TREATMENT HISTORY:  1. Right partial glossectomy, 9/28/2016    Rose [facility] for [facility]-up after her right partial glossectomy. She returns today without complaints. She forgot when the ulcer went away, and currently has no issues. Overall, she has been doing fine. She still checks with Dr. Howard every 6 months. There is no dysphagia, odynophagia, sore throat, or otalgia. She has no adenopathy, fever, chills, or weight loss.  She is a non-smoker. She is eating well.     She still does not use tobacco products. She is not thinking of starting.    Of note, she reports that she has done well with her rheumatologic disorder, but has a chillblane right now on her right 4th DIP.     Past Medical History:   Diagnosis Date    Acne Sprionolactone    Anxiety about health 9/6/2016    previously treated for CIRILO    Cancer Squamous Cell Carcinoma    Surgically removed on tongue    GERD (gastroesophageal reflux disease) Prevacid    Headache     Hx of psychiatric care     Hypothyroidism Synthroid to help with Pernio    Normally would not require treatment    Migraine headache     Psychiatric problem     Sciatica     Therapy      Past Surgical History:   Procedure Laterality Date    PARTIAL GLOSSECTOMY Right 09/28/2016       Current Outpatient Medications:     alprazolam (XANAX XR) 0.5 MG Tb24, , Disp: , Rfl:     ascorbic acid, vitamin C, (VITAMIN C) 500 MG tablet, Take 500 mg by mouth., Disp: , Rfl:     CYANOCOBALAMIN, VITAMIN B-12, ORAL, Take by mouth., Disp: , Rfl:     lansoprazole (PREVACID) 30 MG capsule,  Take 30 mg by mouth once daily., Disp: , Rfl:     levothyroxine (SYNTHROID) 25 MCG tablet, Take 25 mcg by mouth once daily., Disp: , Rfl:     multivitamin (THERAGRAN) per tablet, Take 1 tablet by mouth once daily., Disp: , Rfl:     norethindrone-e.estradioL-iron (LO LOESTRIN FE) 1 mg-10 mcg (24)/10 mcg (2) Tab, Take 1 tablet by mouth once daily., Disp: 28 tablet, Rfl: 11    spironolactone (ALDACTONE) 50 MG tablet, Take 50 mg by mouth once daily., Disp: , Rfl:     sumatriptan (IMITREX) 50 MG tablet, TK ONE T PO FOR MIGRAINE HEADACHE. MAY REPEAT I 2 HOURS IF NOT RESOLVED. DO NOT TAKE MORE THAN 2 IN 1 DAY OR 3 IN 1 WEEK, Disp: , Rfl: 0    tretinoin (RETIN-A) 0.01 % gel, , Disp: , Rfl:     Review of patient's allergies indicates:  No Known Allergies    Social History     Socioeconomic History    Marital status: Single    Number of children: 0   Occupational History     Comment: self employed   Tobacco Use    Smoking status: Never    Smokeless tobacco: Never   Substance and Sexual Activity    Alcohol use: Yes     Alcohol/week: 5.0 standard drinks     Types: 5 Glasses of wine per week     Comment: Glass of red wine per night    Drug use: Never    Sexual activity: Yes     Partners: Male     Comment: Lo Loestrin Fe   Other Topics Concern    Patient feels they ought to cut down on drinking/drug use No    Patient annoyed by others criticizing their drinking/drug use No    Patient has felt bad or guilty about drinking/drug use No    Patient has had a drink/used drugs as an eye opener in the AM No   Social History Narrative    She is an , single, lives alone, no children       Family History   Problem Relation Age of Onset    Cancer Maternal Grandfather     Celiac disease Sister     Crohn's disease Sister     Autoimmune disease Sister     Migraines Sister     Thyroid disease Sister     Thyroid disease Mother     Migraines Father      Review of Systems   Constitutional: Negative.  Negative for activity change,  appetite change, chills, diaphoresis, fatigue, fever and unexpected weight change.   HENT: Negative.  Negative for congestion, dental problem, drooling, ear discharge, ear pain, facial swelling, hearing loss, mouth sores, nosebleeds, postnasal drip, rhinorrhea, sinus pressure, sneezing, sore throat, tinnitus, trouble swallowing and voice change.    Eyes: Negative.    Respiratory: Negative.  Negative for cough, choking and shortness of breath.    Cardiovascular: Negative.  Negative for chest pain, palpitations and leg swelling.   Gastrointestinal: Negative.    Endocrine: Negative.    Genitourinary: Negative.    Musculoskeletal:  Positive for neck pain. Negative for gait problem.        Hip injection as above     Skin:  Positive for color change and pallor.        Chillblains of hands   Allergic/Immunologic: Negative.  Negative for environmental allergies and immunocompromised state.   Neurological:  Positive for numbness and headaches. Negative for dizziness, facial asymmetry, speech difficulty, weakness and light-headedness.   Hematological:  Negative for adenopathy. Bruises/bleeds easily.   Psychiatric/Behavioral: Negative.  The patient is not nervous/anxious.        Answers submitted by the patient for this visit:  Review of Symptoms Questionnaire  (Submitted on 5/12/2023)  Muscle aches / pain?: Yes    Objective:      Physical Exam  Vitals reviewed.   Constitutional:       General: She is not in acute distress.     Appearance: She is well-developed. She is not ill-appearing.   HENT:      Head: Normocephalic and atraumatic.      Jaw: No trismus.      Right Ear: Hearing and external ear normal.      Left Ear: Hearing and external ear normal.      Nose: Nose normal. No nasal deformity, mucosal edema or rhinorrhea.      Mouth/Throat:      Mouth: Mucous membranes are not pale, not dry and not cyanotic. No lacerations or oral lesions.      Dentition: Normal dentition. Does not have dentures.      Pharynx: Uvula midline.  No oropharyngeal exudate, posterior oropharyngeal erythema or uvula swelling.      Tonsils: No tonsillar abscesses.     Eyes:      General: Lids are normal.      Conjunctiva/sclera: Conjunctivae normal.      Right eye: Right conjunctiva is not injected.      Left eye: Left conjunctiva is not injected.      Pupils: Pupils are equal, round, and reactive to light.   Neck:      Thyroid: No thyroid mass or thyromegaly.      Vascular: No JVD.      Trachea: Phonation normal. No tracheal deviation.      Comments: Salivary glands - there are no lesions or asymmetric findings in the submandibular or parotid glands    Pulmonary:      Effort: Pulmonary effort is normal. No respiratory distress.      Breath sounds: No stridor.   Musculoskeletal:         General: No tenderness.      Cervical back: Full passive range of motion without pain, normal range of motion and neck supple.   Lymphadenopathy:      Head:      Right side of head: No submandibular, tonsillar, preauricular, posterior auricular or occipital adenopathy.      Left side of head: No submental, submandibular, tonsillar, preauricular or posterior auricular adenopathy.      Cervical: No cervical adenopathy.      Right cervical: No deep or posterior cervical adenopathy.     Left cervical: No deep or posterior cervical adenopathy.      Upper Body:      Right upper body: No supraclavicular adenopathy.      Left upper body: No supraclavicular adenopathy.      Comments:      Skin:     Findings: No erythema, lesion or rash.      Nails: There is no clubbing.   Neurological:      Mental Status: She is alert and oriented to person, place, and time.      Cranial Nerves: No cranial nerve deficit.      Gait: Gait normal.   Psychiatric:         Speech: Speech normal.         Behavior: Behavior is cooperative.       No lesions on the left side of the tongue. All is soft.     Assessment & Plan:       Problem List Items Addressed This Visit       Squamous cell carcinoma of tongue      The left-sided ulcer has resolved. She is back to baseline. We suspect that this was traumatic, so she can followup with me as originally planned, in about one year. Obviously sooner if anything changes.

## 2023-05-13 NOTE — ASSESSMENT & PLAN NOTE
The left-sided ulcer has resolved. She is back to baseline. We suspect that this was traumatic, so she can followup with me as originally planned, in about one year. Obviously sooner if anything changes.